# Patient Record
Sex: MALE | HISPANIC OR LATINO | Employment: FULL TIME | URBAN - METROPOLITAN AREA
[De-identification: names, ages, dates, MRNs, and addresses within clinical notes are randomized per-mention and may not be internally consistent; named-entity substitution may affect disease eponyms.]

---

## 2017-09-26 ENCOUNTER — OFFICE VISIT (OUTPATIENT)
Dept: MEDICAL GROUP | Facility: PHYSICIAN GROUP | Age: 42
End: 2017-09-26
Payer: COMMERCIAL

## 2017-09-26 VITALS
DIASTOLIC BLOOD PRESSURE: 80 MMHG | BODY MASS INDEX: 28.84 KG/M2 | HEART RATE: 60 BPM | OXYGEN SATURATION: 98 % | TEMPERATURE: 98.6 F | WEIGHT: 206 LBS | RESPIRATION RATE: 16 BRPM | SYSTOLIC BLOOD PRESSURE: 134 MMHG | HEIGHT: 71 IN

## 2017-09-26 DIAGNOSIS — Z00.00 ROUTINE GENERAL MEDICAL EXAMINATION AT A HEALTH CARE FACILITY: ICD-10-CM

## 2017-09-26 DIAGNOSIS — R03.0 PREHYPERTENSION: ICD-10-CM

## 2017-09-26 DIAGNOSIS — J01.10 ACUTE NON-RECURRENT FRONTAL SINUSITIS: ICD-10-CM

## 2017-09-26 PROCEDURE — 99214 OFFICE O/P EST MOD 30 MIN: CPT | Performed by: INTERNAL MEDICINE

## 2017-09-26 ASSESSMENT — PATIENT HEALTH QUESTIONNAIRE - PHQ9: CLINICAL INTERPRETATION OF PHQ2 SCORE: 0

## 2017-09-26 NOTE — ASSESSMENT & PLAN NOTE
Patient presents today complaining of prescription drug, sore throat, myalgia, feeling sick, stomach upset, diarrhea, dizzy (he describes as lightheaded). He not been able to exercise. He is concerned. He has had once sinus infection many years ago, similar symptoms . He denies fever. He reports that he is getting better. 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 He denies earaches, eye pain, decreased vision, headache, tinnitus, decreased hearing

## 2017-09-26 NOTE — ASSESSMENT & PLAN NOTE
Slight pre hypertension. Likes salt. He is fit. He is very active. She denies chest pain, shortness of breath, headache, blurry vision. He is feeling dizzy for the last week.

## 2017-09-26 NOTE — PROGRESS NOTES
New Patient to Establish    Reason to establish: feeling sick, establish. Preventive     Donnie Messina is a 42 y.o. male here today for evaluation and management of:    Acute non-recurrent frontal sinusitis  Patient presents today complaining of prescription drug, sore throat, myalgia, feeling sick, stomach upset, diarrhea, dizzy (he describes as lightheaded). He not been able to exercise. He is concerned. He has had once sinus infection many years ago, similar symptoms . He denies fever. He reports that he is getting better. 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 He denies earaches, eye pain, decreased vision, headache, tinnitus, decreased hearing    Prehypertension  Slight pre hypertension. Likes salt. He is fit. He is very active. She denies chest pain, shortness of breath, headache, blurry vision. He is feeling dizzy for the last week.      No past medical history on file.    No current outpatient prescriptions on file.     No current facility-administered medications for this visit.        Allergies as of 09/26/2017   • (No Known Allergies)       Social History     Social History   • Marital status: Single     Spouse name: N/A   • Number of children: N/A   • Years of education: N/A     Occupational History   • Not on file.     Social History Main Topics   • Smoking status: Former Smoker     Packs/day: 0.25     Years: 8.00     Quit date: 1/1/1997   • Smokeless tobacco: Never Used   • Alcohol use No      Comment: occ   • Drug use: No   • Sexual activity: Yes     Partners: Male      Comment: 4 children     Other Topics Concern   • Not on file     Social History Narrative    ** Merged History Encounter **            Family History   Problem Relation Age of Onset   • Cancer Neg Hx    • Diabetes Neg Hx    • Heart Disease Neg Hx        No past surgical history on file.    ROS: All systems reviewed are negative except for HPI    /80   Pulse 60   Temp 37 °C (98.6 °F)   Resp 16   Ht 1.803 m (5'  "11\")   Wt 93.4 kg (206 lb)   SpO2 98%   BMI 28.73 kg/m²     Physical Exam  General:  Alert and oriented, No apparent distress.  Eyes: Pupils equal and reactive. No scleral icterus. EOMI  Throat: Clear no erythema or exudates noted. Oral mucosa moist, oral dental intact  Neck: Supple. No cervical or supraclavicular lymphadenopathy noted. Thyroid not enlarged. Tympanic membrane intact and clear. No nystagmus, hill-pax maneuvers negative   Lungs: normal effort,  Clear to auscultation  Cardiovascular: Regular rate and rhythm. No murmurs, rubs or gallops, pulses intact   Abdomen:  Soft, +BS, no tenderness. No rebound or guarding noted. No hepato or splenomegaly   Extremities: No clubbing, cyanosis, edema.  Neuro: cranial nerves intact, sensation intact   Muscle skeletal: strength 5/5 on all extremities   Skin: Clear. No rash or suspicious skin lesions noted.    Assessment and Plan    1. Acute non-recurrent frontal sinusitis  I advised patient and saline nasal spray, gurgling, normal hydration, resting. If starts to have fever, worsening to call for reevaluation  Seems viral symptoms  Doing better  If eyes are still itchy, scratchy. If persistent to consider ophthalmologist referral      2. Prehypertension  Continue to monitor, if not better to consider treatment     3. Routine general medical examination at a health care facility  Lab work to follow   He is sick , hold in influenza and dtap at this time    - CBC WITH DIFFERENTIAL; Future  - COMP METABOLIC PANEL; Future  - LIPID PROFILE; Future      Followup: Return if symptoms worsen or fail to improve, for Short.    Signed by: Eligio Gilmore M.D.  "

## 2017-09-28 ENCOUNTER — HOSPITAL ENCOUNTER (OUTPATIENT)
Dept: LAB | Facility: MEDICAL CENTER | Age: 42
End: 2017-09-28
Attending: INTERNAL MEDICINE
Payer: COMMERCIAL

## 2017-09-28 DIAGNOSIS — Z00.00 ROUTINE GENERAL MEDICAL EXAMINATION AT A HEALTH CARE FACILITY: ICD-10-CM

## 2017-09-28 LAB
ALBUMIN SERPL BCP-MCNC: 4.4 G/DL (ref 3.2–4.9)
ALBUMIN/GLOB SERPL: 1.4 G/DL
ALP SERPL-CCNC: 42 U/L (ref 30–99)
ALT SERPL-CCNC: 28 U/L (ref 2–50)
ANION GAP SERPL CALC-SCNC: 8 MMOL/L (ref 0–11.9)
AST SERPL-CCNC: 23 U/L (ref 12–45)
BASOPHILS # BLD AUTO: 0.4 % (ref 0–1.8)
BASOPHILS # BLD: 0.02 K/UL (ref 0–0.12)
BILIRUB SERPL-MCNC: 0.8 MG/DL (ref 0.1–1.5)
BUN SERPL-MCNC: 20 MG/DL (ref 8–22)
CALCIUM SERPL-MCNC: 9.7 MG/DL (ref 8.5–10.5)
CHLORIDE SERPL-SCNC: 103 MMOL/L (ref 96–112)
CHOLEST SERPL-MCNC: 253 MG/DL (ref 100–199)
CO2 SERPL-SCNC: 26 MMOL/L (ref 20–33)
CREAT SERPL-MCNC: 0.9 MG/DL (ref 0.5–1.4)
EOSINOPHIL # BLD AUTO: 0.24 K/UL (ref 0–0.51)
EOSINOPHIL NFR BLD: 5 % (ref 0–6.9)
ERYTHROCYTE [DISTWIDTH] IN BLOOD BY AUTOMATED COUNT: 42.6 FL (ref 35.9–50)
GFR SERPL CREATININE-BSD FRML MDRD: >60 ML/MIN/1.73 M 2
GLOBULIN SER CALC-MCNC: 3.2 G/DL (ref 1.9–3.5)
GLUCOSE SERPL-MCNC: 64 MG/DL (ref 65–99)
HCT VFR BLD AUTO: 48.2 % (ref 42–52)
HDLC SERPL-MCNC: 48 MG/DL
HGB BLD-MCNC: 15.6 G/DL (ref 14–18)
IMM GRANULOCYTES # BLD AUTO: 0.02 K/UL (ref 0–0.11)
IMM GRANULOCYTES NFR BLD AUTO: 0.4 % (ref 0–0.9)
LDLC SERPL CALC-MCNC: 187 MG/DL
LYMPHOCYTES # BLD AUTO: 1.62 K/UL (ref 1–4.8)
LYMPHOCYTES NFR BLD: 33.8 % (ref 22–41)
MCH RBC QN AUTO: 29.4 PG (ref 27–33)
MCHC RBC AUTO-ENTMCNC: 32.4 G/DL (ref 33.7–35.3)
MCV RBC AUTO: 90.9 FL (ref 81.4–97.8)
MONOCYTES # BLD AUTO: 0.33 K/UL (ref 0–0.85)
MONOCYTES NFR BLD AUTO: 6.9 % (ref 0–13.4)
NEUTROPHILS # BLD AUTO: 2.56 K/UL (ref 1.82–7.42)
NEUTROPHILS NFR BLD: 53.5 % (ref 44–72)
NRBC # BLD AUTO: 0 K/UL
NRBC BLD AUTO-RTO: 0 /100 WBC
PLATELET # BLD AUTO: 231 K/UL (ref 164–446)
PMV BLD AUTO: 9 FL (ref 9–12.9)
POTASSIUM SERPL-SCNC: 3.9 MMOL/L (ref 3.6–5.5)
PROT SERPL-MCNC: 7.6 G/DL (ref 6–8.2)
RBC # BLD AUTO: 5.3 M/UL (ref 4.7–6.1)
SODIUM SERPL-SCNC: 137 MMOL/L (ref 135–145)
TRIGL SERPL-MCNC: 88 MG/DL (ref 0–149)
WBC # BLD AUTO: 4.8 K/UL (ref 4.8–10.8)

## 2017-09-28 PROCEDURE — 80061 LIPID PANEL: CPT

## 2017-09-28 PROCEDURE — 85025 COMPLETE CBC W/AUTO DIFF WBC: CPT

## 2017-09-28 PROCEDURE — 36415 COLL VENOUS BLD VENIPUNCTURE: CPT

## 2017-09-28 PROCEDURE — 80053 COMPREHEN METABOLIC PANEL: CPT

## 2017-09-29 ENCOUNTER — TELEPHONE (OUTPATIENT)
Dept: MEDICAL GROUP | Facility: PHYSICIAN GROUP | Age: 42
End: 2017-09-29

## 2017-09-29 NOTE — TELEPHONE ENCOUNTER
----- Message from Eligio Gilmore M.D. sent at 9/29/2017  7:13 AM PDT -----  Please let pt knows that electrolytes, kidney function, liver enzymes, complete blood count are normal  Cholesterol is moderately elevated. No need for treatment at this time, but definitely I would encourage eating healthy, fruits and vegetables, low fat, exercising 3-5 times per week. Avoid dairy product, red meat, bif portion, fried food  We will continue to monitor   Thank you,  Eligio Gilmore M.D.

## 2017-09-29 NOTE — TELEPHONE ENCOUNTER
Phone Number Called: [phone] (home)     Message: LVM to call back.     Left Message for patient to call back: yes

## 2017-09-29 NOTE — LETTER
October 4, 2017        Donnie Messina  [id number]  [place] [address]        Dear Donnie:    Our office has been unable to reach you by phone.  Please call our office at your earliest convenience.  Thank you.       Sincerely,        Eligio Gilmore M.D.    Electronically Signed

## 2017-10-02 NOTE — TELEPHONE ENCOUNTER
Phone Number Called: [phone] (home)     Message: LVM to call back.     Left Message for patient to call back: yes

## 2017-10-04 NOTE — TELEPHONE ENCOUNTER
Phone Number Called: [phone] (home)     Message: ITALO to call back.  Letter mailed to patient requesting he call our office.     Left Message for patient to call back: yes

## 2017-10-23 ENCOUNTER — OFFICE VISIT (OUTPATIENT)
Dept: MEDICAL GROUP | Facility: PHYSICIAN GROUP | Age: 42
End: 2017-10-23
Payer: COMMERCIAL

## 2017-10-23 VITALS
DIASTOLIC BLOOD PRESSURE: 102 MMHG | OXYGEN SATURATION: 97 % | BODY MASS INDEX: 28.56 KG/M2 | RESPIRATION RATE: 16 BRPM | SYSTOLIC BLOOD PRESSURE: 142 MMHG | HEART RATE: 68 BPM | HEIGHT: 71 IN | TEMPERATURE: 97.7 F | WEIGHT: 204 LBS

## 2017-10-23 DIAGNOSIS — J01.10 ACUTE NON-RECURRENT FRONTAL SINUSITIS: ICD-10-CM

## 2017-10-23 DIAGNOSIS — E78.2 MIXED HYPERLIPIDEMIA: ICD-10-CM

## 2017-10-23 DIAGNOSIS — I10 ESSENTIAL HYPERTENSION: ICD-10-CM

## 2017-10-23 DIAGNOSIS — Z00.00 ROUTINE GENERAL MEDICAL EXAMINATION AT A HEALTH CARE FACILITY: ICD-10-CM

## 2017-10-23 PROCEDURE — 99214 OFFICE O/P EST MOD 30 MIN: CPT | Performed by: INTERNAL MEDICINE

## 2017-10-23 RX ORDER — LISINOPRIL 10 MG/1
10 TABLET ORAL DAILY
Qty: 30 TAB | Refills: 3 | Status: SHIPPED | OUTPATIENT
Start: 2017-10-23 | End: 2018-01-30 | Stop reason: SDUPTHER

## 2017-10-23 RX ORDER — AMOXICILLIN AND CLAVULANATE POTASSIUM 875; 125 MG/1; MG/1
1 TABLET, FILM COATED ORAL 2 TIMES DAILY
Qty: 20 TAB | Refills: 0 | Status: SHIPPED | OUTPATIENT
Start: 2017-10-23 | End: 2017-11-20

## 2017-10-23 ASSESSMENT — PAIN SCALES - GENERAL: PAINLEVEL: NO PAIN

## 2017-10-23 NOTE — ASSESSMENT & PLAN NOTE
. He needs to be on cholesterol meds/ since we started BP meds today. Will continue to monitor, lifestyle modifications, we will monitor in 6 months. Pt verbalized understanding

## 2017-10-23 NOTE — ASSESSMENT & PLAN NOTE
BP is elevated, I personally rechecked it and still elevated. he does not check BP at home. he denies chest pain, shortness of breath, leg swelling, palpitation, lightheadedness. He exercise, tries to eat healthy.

## 2017-10-23 NOTE — ASSESSMENT & PLAN NOTE
He is up to date with lab work, immunizations. He is not feeling great. Will postpone influenza at this time

## 2017-10-23 NOTE — PROGRESS NOTES
Subjective:   Donnie Messina is a 42 y.o. male here today for sinus infection     I have seen pt 09/26/2017. He still feels congested, sob when he goes to sleep. Not better. He denies fever, he has mild earache on the left ear. He has slight frontal tenderness on palpation. He tonsils are enlarged. He had been using saline nasal washing, but are not resolving the problem.   He has not been able to exercise lately due to feeling sick      BP elevated today. He does not take blood pressure at home. She denies chest pain, shortness of breath, swelling, or tenderness, headache.     I reviewed lab work with him CBC normal (mother with leukemia), electrolytes, liver enzymes, kidney functions are all normal. Cholesterol is significantly elevated. I will start treatment for hypertension today. We will reevaluate cholesterol in 6 months and decide about treatment, he is fit, exercise regularly, active, tries to eat healthy     Current medicines (including changes today)  Current Outpatient Prescriptions   Medication Sig Dispense Refill   • amoxicillin-clavulanate (AUGMENTIN) 875-125 MG Tab Take 1 Tab by mouth 2 times a day. 20 Tab 0   • lisinopril (PRINIVIL) 10 MG Tab Take 1 Tab by mouth every day. 30 Tab 3     No current facility-administered medications for this visit.      He  has no past medical history on file.    Current Outpatient Prescriptions   Medication Sig Dispense Refill   • amoxicillin-clavulanate (AUGMENTIN) 875-125 MG Tab Take 1 Tab by mouth 2 times a day. 20 Tab 0   • lisinopril (PRINIVIL) 10 MG Tab Take 1 Tab by mouth every day. 30 Tab 3     No current facility-administered medications for this visit.        Allergies as of 10/23/2017   • (No Known Allergies)       Social History     Social History   • Marital status: Single     Spouse name: N/A   • Number of children: N/A   • Years of education: N/A     Occupational History   • Not on file.     Social History Main Topics   • Smoking status:  "Former Smoker     Packs/day: 0.25     Years: 8.00     Quit date: 1/1/1997   • Smokeless tobacco: Never Used   • Alcohol use No      Comment: occ   • Drug use: No   • Sexual activity: Yes     Partners: Male      Comment: 4 children     Other Topics Concern   • Not on file     Social History Narrative    ** Merged History Encounter **             Family History   Problem Relation Age of Onset   • Cancer Neg Hx    • Diabetes Neg Hx    • Heart Disease Neg Hx        History reviewed. No pertinent surgical history.    ROS   All systems reviewed are negative except for HPI     Objective:     Blood pressure 142/102, pulse 68, temperature 36.5 °C (97.7 °F), resp. rate 16, height 1.803 m (5' 11\"), weight 92.5 kg (204 lb), SpO2 97 %. Body mass index is 28.45 kg/m².   Physical Exam:  Constitutional: Alert, no distress.  Skin: Warm, dry, good turgor, no rashes in visible areas.  Eye: Equal, round and reactive, conjunctiva clear, lids normal.  ENMT: Lips without lesions, good dentition, oropharynx clear, tonsils enlarged.  Neck: Trachea midline, no masses, no thyromegaly. + left cervical lymphadenopathy, no supraclavicular lymphadenopathy  Respiratory: Unlabored respiratory effort, lungs clear to auscultation, no wheezes, no ronchi.  Cardiovascular: Normal S1, S2, no murmur, no edema.  Abdomen: Soft, non-tender, no masses, no hepatosplenomegaly.  Psych: Alert and oriented x3, normal affect and mood.        Assessment and Plan:   The following treatment plan was discussed    1. Acute non-recurrent frontal sinusitis  Seems more bacterial superimposed infection, vs allergies.   This patient to continue saline nasal spray, Flonase as needed.  I will also start on augmentin. continue to monitor   He denies seasonal allergies     2. Essential hypertension  We will start on lisinopril. I discussed with patient side effects, as dry cough, importance of close monitoring. Pt agreed with the plan   - lisinopril (PRINIVIL) 10 MG Tab; Take 1 " Tab by mouth every day.  Dispense: 30 Tab; Refill: 3    3. Mixed hyperlipidemia  Continue to monitor, follow-up in 6 months      Followup: Return in about 4 weeks (around 11/20/2017) for Short, follow up on HTN.

## 2017-11-20 ENCOUNTER — OFFICE VISIT (OUTPATIENT)
Dept: MEDICAL GROUP | Facility: PHYSICIAN GROUP | Age: 42
End: 2017-11-20
Payer: COMMERCIAL

## 2017-11-20 VITALS
RESPIRATION RATE: 16 BRPM | DIASTOLIC BLOOD PRESSURE: 78 MMHG | HEIGHT: 71 IN | WEIGHT: 200 LBS | OXYGEN SATURATION: 98 % | SYSTOLIC BLOOD PRESSURE: 142 MMHG | BODY MASS INDEX: 28 KG/M2 | HEART RATE: 62 BPM | TEMPERATURE: 97.5 F

## 2017-11-20 DIAGNOSIS — R21 RASH: ICD-10-CM

## 2017-11-20 DIAGNOSIS — Z23 NEED FOR VACCINATION: ICD-10-CM

## 2017-11-20 DIAGNOSIS — E78.2 MIXED HYPERLIPIDEMIA: ICD-10-CM

## 2017-11-20 DIAGNOSIS — Z30.09 VASECTOMY EVALUATION: ICD-10-CM

## 2017-11-20 DIAGNOSIS — I10 ESSENTIAL HYPERTENSION: ICD-10-CM

## 2017-11-20 PROCEDURE — 90472 IMMUNIZATION ADMIN EACH ADD: CPT | Performed by: INTERNAL MEDICINE

## 2017-11-20 PROCEDURE — 90471 IMMUNIZATION ADMIN: CPT | Performed by: INTERNAL MEDICINE

## 2017-11-20 PROCEDURE — 99214 OFFICE O/P EST MOD 30 MIN: CPT | Mod: 25 | Performed by: INTERNAL MEDICINE

## 2017-11-20 PROCEDURE — 90686 IIV4 VACC NO PRSV 0.5 ML IM: CPT | Performed by: INTERNAL MEDICINE

## 2017-11-20 PROCEDURE — 90715 TDAP VACCINE 7 YRS/> IM: CPT | Performed by: INTERNAL MEDICINE

## 2017-11-20 RX ORDER — TRIAMCINOLONE ACETONIDE 1 MG/G
1 CREAM TOPICAL 2 TIMES DAILY
Qty: 1 TUBE | Refills: 0 | Status: SHIPPED | OUTPATIENT
Start: 2017-11-20 | End: 2018-09-25

## 2017-11-20 NOTE — ASSESSMENT & PLAN NOTE
He was taking lisinopril for one week, and suddenly started to develop a rash on his right arm first patch, couple other patches on the right thigh. It describes them very itchy. No scaly tissue, never had them before. He thought it was from lisinopril. He stopped the medication, but lesions did not resolved. He denies fever, no other lesions, tried allergy medication, but did not resolved the problem

## 2017-11-20 NOTE — ASSESSMENT & PLAN NOTE
He denies side effects from lisinopril, besides questionable rash. He is not taking it. He reports that he is trying everything he can. He is exercising everything, he is trying to eat healthy, he is trying garlic in the morning. Still elevated for his age.

## 2017-11-20 NOTE — ASSESSMENT & PLAN NOTE
He has 4 children. He is with a girlfriend. He does not want children anymore, and would like to be evaluated for vasectomy. He denies discharges, previous STD. Referral in place

## 2017-11-20 NOTE — PROGRESS NOTES
Subjective:   Donnie Messina is a 42 y.o. male here today for hypertension, rash, vasectomy     Vasectomy evaluation  He has 4 children. He is with a girlfriend. He does not want children anymore, and would like to be evaluated for vasectomy. He denies discharges, previous STD. Referral in place    Rash  He was taking lisinopril for one week, and suddenly started to develop a rash on his right arm first patch, couple other patches on the right thigh. It describes them very itchy. No scaly tissue, never had them before. He thought it was from lisinopril. He stopped the medication, but lesions did not resolved. He denies fever, no other lesions, tried allergy medication, but did not resolved the problem    Mixed hyperlipidemia  We will reevaluate at next apt.     Essential hypertension  He denies side effects from lisinopril, besides questionable rash. He is not taking it. He reports that he is trying everything he can. He is exercising everything, he is trying to eat healthy, he is trying garlic in the morning. Still elevated for his age.        Current medicines (including changes today)  Current Outpatient Prescriptions   Medication Sig Dispense Refill   • triamcinolone acetonide (KENALOG) 0.1 % Cream Apply 1 Application to affected area(s) 2 times a day. 1 Tube 0   • lisinopril (PRINIVIL) 10 MG Tab Take 1 Tab by mouth every day. 30 Tab 3     No current facility-administered medications for this visit.      He  has no past medical history on file.    Current Outpatient Prescriptions   Medication Sig Dispense Refill   • triamcinolone acetonide (KENALOG) 0.1 % Cream Apply 1 Application to affected area(s) 2 times a day. 1 Tube 0   • lisinopril (PRINIVIL) 10 MG Tab Take 1 Tab by mouth every day. 30 Tab 3     No current facility-administered medications for this visit.        Allergies as of 11/20/2017   • (No Known Allergies)       Social History     Social History   • Marital status: Single     Spouse  "name: N/A   • Number of children: N/A   • Years of education: N/A     Occupational History   • Not on file.     Social History Main Topics   • Smoking status: Former Smoker     Packs/day: 0.25     Years: 8.00     Quit date: 1/1/1997   • Smokeless tobacco: Never Used   • Alcohol use No      Comment: occ   • Drug use: No   • Sexual activity: Yes     Partners: Female      Comment: 4 children     Other Topics Concern   • Not on file     Social History Narrative    ** Merged History Encounter **             Family History   Problem Relation Age of Onset   • Cancer Neg Hx    • Diabetes Neg Hx    • Heart Disease Neg Hx        History reviewed. No pertinent surgical history.    ROS   All systems reviewed are negative except for HPI       Objective:     Blood pressure 142/78, pulse 62, temperature 36.4 °C (97.5 °F), resp. rate 16, height 1.803 m (5' 11\"), weight 90.7 kg (200 lb), SpO2 98 %. Body mass index is 27.89 kg/m².   Physical Exam:  Constitutional: Alert, no distress.  Skin: Warm, dry. he has couple of papules on right thigh, not blanchable, not cleared, no white scaly   Eye: Equal, round and reactive, conjunctiva clear, lids normal.  ENMT: Lips without lesions, good dentition, oropharynx clear.  Neck: Trachea midline, no masses, no thyromegaly. No cervical or supraclavicular lymphadenopathy  Respiratory: Unlabored respiratory effort, lungs clear to auscultation, no wheezes, no ronchi.  Cardiovascular: Normal S1, S2, no murmur, no edema.  Abdomen: Soft, non-tender, no masses, no hepatosplenomegaly.  Psych: Alert and oriented x3, normal affect and mood.        Assessment and Plan:   The following treatment plan was discussed    1. Rash  Possible eczema. It is not blanchable. Not likely hives. I will try steroid cream. Follow up with derm if not resolving     - triamcinolone acetonide (KENALOG) 0.1 % Cream; Apply 1 Application to affected area(s) 2 times a day.  Dispense: 1 Tube; Refill: 0  - REFERRAL TO " DERMATOLOGY    2. Essential hypertension  I advised to restart medication. reevaluate in 2 months     3. Vasectomy evaluation  Follow up with urology   - REFERRAL TO UROLOGY    4. Need for vaccination  - INFLUENZA VACCINE QUAD INJ >3Y(PF)  - TDAP VACCINE =>6YO IM    5. Mixed hyperlipidemia  Continue to monitor       Followup: Return in about 2 months (around 1/20/2018), or if symptoms worsen or fail to improve, for Short.

## 2017-12-13 ENCOUNTER — OFFICE VISIT (OUTPATIENT)
Dept: DERMATOLOGY | Facility: IMAGING CENTER | Age: 42
End: 2017-12-13
Payer: COMMERCIAL

## 2017-12-13 ENCOUNTER — HOSPITAL ENCOUNTER (OUTPATIENT)
Facility: MEDICAL CENTER | Age: 42
End: 2017-12-13
Attending: DERMATOLOGY
Payer: COMMERCIAL

## 2017-12-13 VITALS — HEIGHT: 71 IN | WEIGHT: 206 LBS | TEMPERATURE: 97.9 F | BODY MASS INDEX: 28.84 KG/M2

## 2017-12-13 DIAGNOSIS — L98.9 DISORDER OF THE SKIN AND SUBCUTANEOUS TISSUE, UNSPECIFIED: ICD-10-CM

## 2017-12-13 PROCEDURE — 99203 OFFICE O/P NEW LOW 30 MIN: CPT | Mod: 25 | Performed by: DERMATOLOGY

## 2017-12-13 PROCEDURE — 88305 TISSUE EXAM BY PATHOLOGIST: CPT

## 2017-12-13 PROCEDURE — 11100 PR BIOPSY OF SKIN LESION: CPT | Performed by: DERMATOLOGY

## 2017-12-13 ASSESSMENT — ENCOUNTER SYMPTOMS
FEVER: 0
WEIGHT LOSS: 0
CHILLS: 0

## 2017-12-13 NOTE — PROGRESS NOTES
Dermatology New Patient Visit    Chief Complaint   Patient presents with   • Rash       Subjective:     HPI:   Donnie Messina is a 42 y.o. male presenting for    Rash x ~1 month   Affects the right leg, right arm, back/trunk  Notes red, swollen, very itchy spots  Has tried using triamcinolone cream w/ minimal relief  Believes the rash first started about a week after he started new medications from PCP (was given lisinopril for HTN and augmentin at the time)  Stopped the pills for about 4 days, no change in rash, but when he started up again, he believe he noted a few more spots  Lives in an apartment with uncle, he does not have a rash -- denies any reports of bed bugs, or having seen any  History of skin cancer: No  History of biopsies:No  History of blistering/severe sunburns:No  Family history of skin cancer:No  Family history of atypical moles:No        No past medical history on file.    Current Outpatient Prescriptions on File Prior to Visit   Medication Sig Dispense Refill   • triamcinolone acetonide (KENALOG) 0.1 % Cream Apply 1 Application to affected area(s) 2 times a day. 1 Tube 0   • lisinopril (PRINIVIL) 10 MG Tab Take 1 Tab by mouth every day. 30 Tab 3     No current facility-administered medications on file prior to visit.        No Known Allergies    Family History   Problem Relation Age of Onset   • Cancer Neg Hx    • Diabetes Neg Hx    • Heart Disease Neg Hx        Social History     Social History   • Marital status: Single     Spouse name: N/A   • Number of children: N/A   • Years of education: N/A     Occupational History   • Not on file.     Social History Main Topics   • Smoking status: Former Smoker     Packs/day: 0.25     Years: 8.00     Quit date: 1/1/1997   • Smokeless tobacco: Never Used   • Alcohol use No      Comment: occ   • Drug use: No   • Sexual activity: Yes     Partners: Female      Comment: 4 children     Other Topics Concern   • Not on file     Social History  "Narrative    ** Merged History Encounter **            Review of Systems   Constitutional: Negative for chills, fever and weight loss.   Skin: Positive for itching and rash.   All other systems reviewed and are negative.       Objective:     A focused cutaneous exam was completed including: hair, ears, face, eyelids, conjunctiva, lips, neck, chest, abdomen, back, left and right upper extremities (including hands/digits and fingernails), with the following pertinent findings listed below. Remaining above-listed examined areas within normal limits / negative for rashes or lesions.    Temperature 36.6 °C (97.9 °F), height 1.803 m (5' 11\"), weight 93.4 kg (206 lb).    Physical Exam   Constitutional: He is oriented to person, place, and time and well-developed, well-nourished, and in no distress.   HENT:   Head: Normocephalic and atraumatic.   Right Ear: External ear normal.   Left Ear: External ear normal.   Nose: Nose normal.   Eyes: Conjunctivae are normal.   Neck: Normal range of motion.   Pulmonary/Chest: Effort normal.   Neurological: He is alert and oriented to person, place, and time.   Skin: Skin is warm and dry.        Psychiatric: Mood and affect normal.   Vitals reviewed.      DATA: none applicable to review    Assessment and Plan:     1. Disorder of the skin and subcutaneous tissue, unspecified  Comment: ddx broad at this point -- papular urticaria/dermal hypersensitivity vs bites (slightly linear appearance of spots on the back) vs ?fixed drug eruption (not classical in appearance) vs  unlikely urticarial vasculitis  - educated patient about possible diagnoses, management options, and expectations of treatment  - we discussed risks/benefits of biopsy today, patient agreed, see procedure note  - recommended zyrtec 10mg qhs, titrate up to BID as tolerated -- discussed s/e drowsiness  - instructed to check for bed bugs in the home  - if c/w possible fixed drug (not classic), would discuss with PCP regarding " medication switch  - can continue triamcinolone 0.1% ointment to affected area of the body twice daily when active/itchy. Side effects discussed, including skin thinning, appearance of stretch marks (striae), easy bruising, telangiectasias, and possible increased hair growth     Followup: Return in about 1 week (around 12/20/2017) for suture.    Dory Serna M.D.

## 2017-12-19 ENCOUNTER — OFFICE VISIT (OUTPATIENT)
Dept: DERMATOLOGY | Facility: IMAGING CENTER | Age: 42
End: 2017-12-19
Payer: COMMERCIAL

## 2017-12-19 DIAGNOSIS — L98.9 DISORDER OF THE SKIN AND SUBCUTANEOUS TISSUE, UNSPECIFIED: ICD-10-CM

## 2017-12-19 NOTE — PROGRESS NOTES
Patient seen by Sujey ALMEIDA) for suture removal today. No problems with surgical site. Further f/u discussed over the phone.    Dory Serna

## 2017-12-20 ENCOUNTER — TELEPHONE (OUTPATIENT)
Dept: DERMATOLOGY | Facility: IMAGING CENTER | Age: 42
End: 2017-12-20

## 2018-01-23 ENCOUNTER — APPOINTMENT (OUTPATIENT)
Dept: MEDICAL GROUP | Facility: PHYSICIAN GROUP | Age: 43
End: 2018-01-23
Payer: COMMERCIAL

## 2018-01-30 ENCOUNTER — OFFICE VISIT (OUTPATIENT)
Dept: MEDICAL GROUP | Facility: PHYSICIAN GROUP | Age: 43
End: 2018-01-30
Payer: COMMERCIAL

## 2018-01-30 VITALS
SYSTOLIC BLOOD PRESSURE: 138 MMHG | OXYGEN SATURATION: 97 % | TEMPERATURE: 97.6 F | BODY MASS INDEX: 28.42 KG/M2 | HEART RATE: 56 BPM | RESPIRATION RATE: 14 BRPM | DIASTOLIC BLOOD PRESSURE: 82 MMHG | HEIGHT: 71 IN | WEIGHT: 203 LBS

## 2018-01-30 DIAGNOSIS — E16.2 HYPOGLYCEMIA: ICD-10-CM

## 2018-01-30 DIAGNOSIS — E78.2 MIXED HYPERLIPIDEMIA: ICD-10-CM

## 2018-01-30 DIAGNOSIS — L30.9 ECZEMA, UNSPECIFIED TYPE: ICD-10-CM

## 2018-01-30 DIAGNOSIS — I10 ESSENTIAL HYPERTENSION: ICD-10-CM

## 2018-01-30 PROCEDURE — 99214 OFFICE O/P EST MOD 30 MIN: CPT | Performed by: INTERNAL MEDICINE

## 2018-01-30 RX ORDER — LISINOPRIL 10 MG/1
10 TABLET ORAL DAILY
Qty: 90 TAB | Refills: 3 | Status: SHIPPED | OUTPATIENT
Start: 2018-01-30 | End: 2018-07-24

## 2018-01-30 NOTE — ASSESSMENT & PLAN NOTE
From review of lab work. I will continue to monitor. A1c at next appointment to rule out diabetes.

## 2018-01-30 NOTE — ASSESSMENT & PLAN NOTE
He continue to have the rash on his arm. Sometime itchy. He uses steroid cream and gets better. He saw dermatology, and was told not to be concerned

## 2018-01-30 NOTE — PROGRESS NOTES
Subjective:   Donnie Messina is a 42 y.o. male here today for hypertension, lab work     Essential hypertension  He suppose to be on lisinopril 10 mg daily. He denies side effects from that. he does not check blood pressure at home. He denies chest pain, shortness of breath, leg swelling, palpitations or diaphoresis.    Hypoglycemia  From review of lab work. I will continue to monitor. A1c at next appointment to rule out diabetes.    Mixed hyperlipidemia  LDL is very elevated, continue to monitor, consider statin if remains the same at next apt     Eczema  He continue to have the rash on his arm. Sometime itchy. He uses steroid cream and gets better. He saw dermatology, and was told not to be concerned        Current medicines (including changes today)  Current Outpatient Prescriptions   Medication Sig Dispense Refill   • lisinopril (PRINIVIL) 10 MG Tab Take 1 Tab by mouth every day. 90 Tab 3   • triamcinolone acetonide (KENALOG) 0.1 % Cream Apply 1 Application to affected area(s) 2 times a day. 1 Tube 0     No current facility-administered medications for this visit.      He  has no past medical history on file.    Current Outpatient Prescriptions   Medication Sig Dispense Refill   • lisinopril (PRINIVIL) 10 MG Tab Take 1 Tab by mouth every day. 90 Tab 3   • triamcinolone acetonide (KENALOG) 0.1 % Cream Apply 1 Application to affected area(s) 2 times a day. 1 Tube 0     No current facility-administered medications for this visit.        Allergies as of 01/30/2018   • (No Known Allergies)       Social History     Social History   • Marital status: Single     Spouse name: N/A   • Number of children: N/A   • Years of education: N/A     Occupational History   • Not on file.     Social History Main Topics   • Smoking status: Former Smoker     Packs/day: 0.25     Years: 8.00     Quit date: 1/1/1997   • Smokeless tobacco: Never Used   • Alcohol use No      Comment: occ   • Drug use: No   • Sexual activity: Yes  "    Partners: Female      Comment: 4 children     Other Topics Concern   • Not on file     Social History Narrative    ** Merged History Encounter **             Family History   Problem Relation Age of Onset   • Cancer Neg Hx    • Diabetes Neg Hx    • Heart Disease Neg Hx        History reviewed. No pertinent surgical history.    ROS   All systems reviewed are negative except for HPI       Objective:     Blood pressure 138/82, pulse (!) 56, temperature 36.4 °C (97.6 °F), resp. rate 14, height 1.803 m (5' 11\"), weight 92.1 kg (203 lb), SpO2 97 %. Body mass index is 28.31 kg/m².   Physical Exam:  Constitutional: Alert, no distress.  Skin: Warm, dry, good turgor, as per above, erythematous patches on her arm., well marked   Eye: Equal, round and reactive, conjunctiva clear, lids normal.  ENMT: Lips without lesions, good dentition, oropharynx clear.  Neck: Trachea midline, no masses, no thyromegaly. No cervical or supraclavicular lymphadenopathy  Respiratory: Unlabored respiratory effort, lungs clear to auscultation, no wheezes, no ronchi.  Cardiovascular: Normal S1, S2, no murmur, no edema.  Abdomen: Soft, non-tender, no masses, no hepatosplenomegaly.  Psych: Alert and oriented x3, normal affect and mood.        Assessment and Plan:   The following treatment plan was discussed    1. Essential hypertension  Continue same treatment. Continue to monitor  - lisinopril (PRINIVIL) 10 MG Tab; Take 1 Tab by mouth every day.  Dispense: 90 Tab; Refill: 3  - CBC WITH DIFFERENTIAL; Future    2. Mixed hyperlipidemia  Continue to monitor. Consider statin, if remains elevated  - CBC WITH DIFFERENTIAL; Future  - LIPID PROFILE; Future    3. Eczema, unspecified type  Steroid cream prn     4. Hypoglycemia  Continue to monitor, A1c for next time, to rule out diabetes   - COMP METABOLIC PANEL; Future  - HEMOGLOBIN A1C; Future      Followup: No Follow-up on file.         "

## 2018-05-02 ENCOUNTER — TELEPHONE (OUTPATIENT)
Dept: DERMATOLOGY | Facility: IMAGING CENTER | Age: 43
End: 2018-05-02

## 2018-05-02 NOTE — TELEPHONE ENCOUNTER
Spoke with patient today, still very itchy. Will have him come in the next 3-4 weeks for repeat f/u visit.

## 2018-07-24 ENCOUNTER — OFFICE VISIT (OUTPATIENT)
Dept: MEDICAL GROUP | Facility: PHYSICIAN GROUP | Age: 43
End: 2018-07-24
Payer: COMMERCIAL

## 2018-07-24 VITALS
RESPIRATION RATE: 16 BRPM | DIASTOLIC BLOOD PRESSURE: 84 MMHG | WEIGHT: 200 LBS | HEIGHT: 71 IN | BODY MASS INDEX: 28 KG/M2 | OXYGEN SATURATION: 97 % | HEART RATE: 80 BPM | TEMPERATURE: 97.7 F | SYSTOLIC BLOOD PRESSURE: 142 MMHG

## 2018-07-24 DIAGNOSIS — E78.2 MIXED HYPERLIPIDEMIA: ICD-10-CM

## 2018-07-24 DIAGNOSIS — E16.2 HYPOGLYCEMIA: ICD-10-CM

## 2018-07-24 DIAGNOSIS — L73.9 FOLLICULITIS: ICD-10-CM

## 2018-07-24 DIAGNOSIS — I10 ESSENTIAL HYPERTENSION: ICD-10-CM

## 2018-07-24 PROBLEM — Z30.09 VASECTOMY EVALUATION: Status: RESOLVED | Noted: 2017-09-26 | Resolved: 2018-07-24

## 2018-07-24 PROCEDURE — 99214 OFFICE O/P EST MOD 30 MIN: CPT | Performed by: INTERNAL MEDICINE

## 2018-07-24 RX ORDER — LISINOPRIL 20 MG/1
20 TABLET ORAL DAILY
Qty: 90 TAB | Refills: 3 | Status: SHIPPED | OUTPATIENT
Start: 2018-07-24 | End: 2023-08-30 | Stop reason: SDUPTHER

## 2018-07-24 RX ORDER — DOXYCYCLINE HYCLATE 100 MG
100 TABLET ORAL 2 TIMES DAILY
Qty: 28 TAB | Refills: 0 | Status: SHIPPED | OUTPATIENT
Start: 2018-07-24 | End: 2018-08-17

## 2018-07-24 ASSESSMENT — PAIN SCALES - GENERAL: PAINLEVEL: NO PAIN

## 2018-07-24 NOTE — ASSESSMENT & PLAN NOTE
LDL significantly elevated. He refused treatment, last time. We will review lab work and discuss treatment options next time

## 2018-07-24 NOTE — PROGRESS NOTES
Subjective:   Donnie Messina is a 43 y.o. male here today for hypertension, skin lesion, lab work    Essential hypertension  Blood pressure still slightly elevated.  He reports compliant with medication, lisinopril 10 mg daily.  He denies chest pain, shortness of breath, lightheadedness or headache.  He has healthy lifestyle.  He eats healthy, exercising 3 times a week.  He does not know much about his family history.    Folliculitis  He reports some blister, patchy erythematous lesion on his head, few of them with yellow spots as folliculitis. + itchy. He has had them for couple of months. 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 No fever. He has had some lesion on his arm. I have sent him to dermatology. He had skin biopsy and possible allergies.     Hypoglycemia  From lab work review. Continue to monitor. Rule out diabetes     Mixed hyperlipidemia  LDL significantly elevated. He refused treatment, last time. We will review lab work and discuss treatment options next time        Current medicines (including changes today)  Current Outpatient Prescriptions   Medication Sig Dispense Refill   • lisinopril (PRINIVIL) 20 MG Tab Take 1 Tab by mouth every day. 90 Tab 3   • doxycycline (VIBRAMYCIN) 100 MG Tab Take 1 Tab by mouth 2 times a day. 28 Tab 0   • triamcinolone acetonide (KENALOG) 0.1 % Cream Apply 1 Application to affected area(s) 2 times a day. 1 Tube 0     No current facility-administered medications for this visit.      He  has no past medical history on file.    Current Outpatient Prescriptions   Medication Sig Dispense Refill   • lisinopril (PRINIVIL) 20 MG Tab Take 1 Tab by mouth every day. 90 Tab 3   • doxycycline (VIBRAMYCIN) 100 MG Tab Take 1 Tab by mouth 2 times a day. 28 Tab 0   • triamcinolone acetonide (KENALOG) 0.1 % Cream Apply 1 Application to affected area(s) 2 times a day. 1 Tube 0     No current facility-administered medications for this visit.        Allergies as of  "07/24/2018   • (No Known Allergies)       Social History     Social History   • Marital status: Single     Spouse name: N/A   • Number of children: N/A   • Years of education: N/A     Occupational History   • Not on file.     Social History Main Topics   • Smoking status: Former Smoker     Packs/day: 0.25     Years: 8.00     Quit date: 1/1/1997   • Smokeless tobacco: Never Used   • Alcohol use No      Comment: occ   • Drug use: No   • Sexual activity: Yes     Partners: Female      Comment: 4 children     Other Topics Concern   • Not on file     Social History Narrative    ** Merged History Encounter **             Family History   Problem Relation Age of Onset   • Cancer Neg Hx    • Diabetes Neg Hx    • Heart Disease Neg Hx        History reviewed. No pertinent surgical history.    ROS   All systems reviewed are negative except for HPI       Objective:     Blood pressure 142/84, pulse 80, temperature 36.5 °C (97.7 °F), resp. rate 16, height 1.803 m (5' 11\"), weight 90.7 kg (200 lb), SpO2 97 %. Body mass index is 27.89 kg/m².   Physical Exam:  Constitutional: Alert, no distress.  Skin: Warm, dry, good turgor, as per above   Eye: Equal, round and reactive, conjunctiva clear, lids normal.  ENMT: Lips without lesions, good dentition, oropharynx clear.  Neck: Trachea midline, no masses, no thyromegaly. No cervical or supraclavicular lymphadenopathy  Respiratory: Unlabored respiratory effort, lungs clear to auscultation, no wheezes, no ronchi.  Cardiovascular: Normal S1, S2, no murmur, no edema.  Abdomen: Soft, non-tender, no masses, no hepatosplenomegaly.  Psych: Alert and oriented x3, normal affect and mood.        Assessment and Plan:   The following treatment plan was discussed    1. Essential hypertension  Increase lisinopril to 20 mg daily. Consider adding amlodipine   - lisinopril (PRINIVIL) 20 MG Tab; Take 1 Tab by mouth every day.  Dispense: 90 Tab; Refill: 3  - COMP METABOLIC PANEL; Future  - CBC WITH " DIFFERENTIAL; Future    2. Folliculitis  Trial with doxy. If not better. Consider to stop lisinopril and switch to amlodipine   - CBC WITH DIFFERENTIAL; Future    3. Mixed hyperlipidemia  Continue to monitor. Continue healthy lifestyle   - COMP METABOLIC PANEL; Future  - LIPID PROFILE; Future    4. Hypoglycemia  Continue to monitor   - HEMOGLOBIN A1C; Future      Followup: Return in about 2 months (around 9/24/2018), or if symptoms worsen or fail to improve, for Short.

## 2018-07-24 NOTE — ASSESSMENT & PLAN NOTE
Blood pressure still slightly elevated.  He reports compliant with medication, lisinopril 10 mg daily.  He denies chest pain, shortness of breath, lightheadedness or headache.  He has healthy lifestyle.  He eats healthy, exercising 3 times a week.  He does not know much about his family history.

## 2018-07-24 NOTE — ASSESSMENT & PLAN NOTE
He reports some blister, patchy erythematous lesion on his head, few of them with yellow spots as folliculitis. + itchy. He has had them for couple of months. 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 No fever. He has had some lesion on his arm. I have sent him to dermatology. He had skin biopsy and possible allergies.

## 2018-08-17 ENCOUNTER — HOSPITAL ENCOUNTER (OUTPATIENT)
Facility: MEDICAL CENTER | Age: 43
End: 2018-08-17
Attending: PHYSICIAN ASSISTANT
Payer: COMMERCIAL

## 2018-08-17 ENCOUNTER — OFFICE VISIT (OUTPATIENT)
Dept: MEDICAL GROUP | Facility: PHYSICIAN GROUP | Age: 43
End: 2018-08-17
Payer: COMMERCIAL

## 2018-08-17 VITALS
SYSTOLIC BLOOD PRESSURE: 102 MMHG | DIASTOLIC BLOOD PRESSURE: 78 MMHG | TEMPERATURE: 97.9 F | OXYGEN SATURATION: 98 % | RESPIRATION RATE: 16 BRPM | WEIGHT: 202 LBS | HEART RATE: 77 BPM | HEIGHT: 71 IN | BODY MASS INDEX: 28.28 KG/M2

## 2018-08-17 DIAGNOSIS — N30.01 ACUTE CYSTITIS WITH HEMATURIA: ICD-10-CM

## 2018-08-17 LAB
APPEARANCE UR: NORMAL
BILIRUB UR STRIP-MCNC: NEGATIVE MG/DL
COLOR UR AUTO: NORMAL
GLUCOSE UR STRIP.AUTO-MCNC: NEGATIVE MG/DL
KETONES UR STRIP.AUTO-MCNC: NORMAL MG/DL
LEUKOCYTE ESTERASE UR QL STRIP.AUTO: NORMAL
NITRITE UR QL STRIP.AUTO: POSITIVE
PH UR STRIP.AUTO: 5.5 [PH] (ref 5–8)
PROT UR QL STRIP: NORMAL MG/DL
RBC UR QL AUTO: NORMAL
SP GR UR STRIP.AUTO: >1.03
UROBILINOGEN UR STRIP-MCNC: 0.2 MG/DL

## 2018-08-17 PROCEDURE — 87086 URINE CULTURE/COLONY COUNT: CPT

## 2018-08-17 PROCEDURE — 99214 OFFICE O/P EST MOD 30 MIN: CPT | Performed by: PHYSICIAN ASSISTANT

## 2018-08-17 PROCEDURE — 87077 CULTURE AEROBIC IDENTIFY: CPT

## 2018-08-17 PROCEDURE — 81002 URINALYSIS NONAUTO W/O SCOPE: CPT | Performed by: PHYSICIAN ASSISTANT

## 2018-08-17 PROCEDURE — 87186 SC STD MICRODIL/AGAR DIL: CPT

## 2018-08-17 RX ORDER — CIPROFLOXACIN 500 MG/1
500 TABLET, FILM COATED ORAL EVERY 12 HOURS
Qty: 28 TAB | Refills: 0 | Status: SHIPPED | OUTPATIENT
Start: 2018-08-17 | End: 2018-08-31

## 2018-08-17 RX ORDER — PHENAZOPYRIDINE HYDROCHLORIDE 200 MG/1
200 TABLET, FILM COATED ORAL 3 TIMES DAILY
Qty: 6 TAB | Refills: 0 | Status: SHIPPED | OUTPATIENT
Start: 2018-08-17 | End: 2018-08-19

## 2018-08-17 ASSESSMENT — PAIN SCALES - GENERAL: PAINLEVEL: NO PAIN

## 2018-08-17 NOTE — ASSESSMENT & PLAN NOTE
Symptom onset: 3 days ago.  States he is in a monogamous relationship and has been sexually active with current partner for 1 month. He tells me that 4 days ago he twisted his left ankle and took over-the-counter Flanax for pain symptoms.  States UTI symptoms developed 1 day after taking medication.  Current symptoms: painful, urgent, frequent voids. No blood noted in urine.  He tells me that he has also been experiencing a subjective fever, nausea and abdominal pain. States he no longer experiencing abdominal pain or nausea.  Since onset symptoms are: States symptoms have improved today.  Treatments tried: He tells me he has been taking over-the-counter Tylenol with no alleviation of fever symptoms.  Denies OTC antispasm med.  Associated symptoms: negative for flank pain, vomiting, urethral discharge, foul-smelling discharge, pelvic pain, joint pain.

## 2018-08-17 NOTE — PROGRESS NOTES
Chief Complaint   Patient presents with   • Fever     x 2 days, trouble urinating       HISTORY OF PRESENT ILLNESS: Donnie Messina is an established 43 y.o. male here to discuss the evaluation and management of:    Acute cystitis with hematuria  Symptom onset: 3 days ago.  States he is in a monogamous relationship and has been sexually active with current partner for 1 month. He tells me that 4 days ago he twisted his left ankle and took over-the-counter Flanax for pain symptoms.  States UTI symptoms developed 1 day after taking medication.  Current symptoms: painful, urgent, frequent voids. No blood noted in urine.  He tells me that he has also been experiencing a subjective fever, nausea and abdominal pain. States he no longer experiencing abdominal pain or nausea.  Since onset symptoms are: States symptoms have improved today.  Treatments tried: He tells me he has been taking over-the-counter Tylenol with no alleviation of fever symptoms.  Denies OTC antispasm med.  Associated symptoms: negative for flank pain, vomiting, urethral discharge, foul-smelling discharge, pelvic pain, joint pain.        Patient Active Problem List    Diagnosis Date Noted   • Acute cystitis with hematuria 08/17/2018   • Folliculitis 07/24/2018   • Hypoglycemia 01/30/2018   • Eczema 11/20/2017   • Mixed hyperlipidemia 10/23/2017   • Essential hypertension 09/26/2017       Allergies:Patient has no known allergies.    Current Outpatient Prescriptions   Medication Sig Dispense Refill   • ciprofloxacin (CIPRO) 500 MG Tab Take 1 Tab by mouth every 12 hours for 14 days. 28 Tab 0   • phenazopyridine (PYRIDIUM) 200 MG Tab Take 1 Tab by mouth 3 times a day for 2 days. 6 Tab 0   • lisinopril (PRINIVIL) 20 MG Tab Take 1 Tab by mouth every day. 90 Tab 3   • triamcinolone acetonide (KENALOG) 0.1 % Cream Apply 1 Application to affected area(s) 2 times a day. 1 Tube 0     No current facility-administered medications for this visit.   "      Social History   Substance Use Topics   • Smoking status: Former Smoker     Packs/day: 0.25     Years: 8.00     Quit date: 1997   • Smokeless tobacco: Never Used   • Alcohol use No      Comment: occ       Family Status   Relation Status   • Mo Alive   • Fa    • Sis Alive   • Bro Alive   • MGMo    • MGFa    • PGMo    • PGFa    • Son Alive   • Son Alive   • Son Alive   • Neg Hx (Not Specified)     Family History   Problem Relation Age of Onset   • No Known Problems Mother    • No Known Problems Sister    • No Known Problems Brother    • No Known Problems Son    • No Known Problems Son    • No Known Problems Son    • Cancer Neg Hx    • Diabetes Neg Hx    • Heart Disease Neg Hx        ROS:  Review of Systems   Constitutional: Negative for chills, weight loss and malaise/fatigue.  Positive for subjective fever.  HENT: Negative for ear pain, nosebleeds, congestion, sore throat and neck pain.    Eyes: Negative for blurred vision.   Respiratory: Negative for cough, sputum production, shortness of breath and wheezing.    Cardiovascular: Negative for chest pain, palpitations, orthopnea and leg swelling.   Gastrointestinal: Negative for heartburn, vomiting and abdominal pain.  Positive for nausea.  Genitourinary: + for dysuria, urgency and frequency.   Musculoskeletal: Negative for myalgias, back pain and joint pain.   Skin: Negative for rash and itching.   Neurological: Negative for dizziness, tingling, tremors, sensory change, focal weakness and headaches.   Endo/Heme/Allergies: Does not bruise/bleed easily.   Psychiatric/Behavioral: Negative for depression, suicidal ideas and memory loss.  The patient is not nervous/anxious and does not have insomnia.    All other systems reviewed and are negative except as in HPI.    Exam: Blood pressure 102/78, pulse 77, temperature 36.6 °C (97.9 °F), resp. rate 16, height 1.803 m (5' 11\"), weight 91.6 kg (202 lb), SpO2 98 %. Body mass " index is 28.17 kg/m².  General: Normal appearing. No distress.  HEENT: Normocephalic. Eyes conjunctiva clear lids without ptosis and ears normal shape and contour.  Neck: Supple without JVD or bruit. Thyroid is not enlarged.  Pulmonary: Clear to ausculation.  Normal effort. No rales, ronchi, or wheezing.  Cardiovascular: Regular rate and rhythm without murmur.   Abdomen: Soft, nontender, nondistended. Normal bowel sounds. Liver and spleen are not palpable.  Negative for CVA tenderness.  Neurologic: Grossly nonfocal.  Cranial nerves are normal.   Lymph: No cervical, supraclavicular or axillary lymph nodes are palpable  Skin: Warm and dry.  No rashes or suspicious skin lesions.  Musculoskeletal: Normal gait. No extremity cyanosis, clubbing, or edema.  Psych: Normal mood and affect. Alert and oriented x3. Judgment and insight is normal.    Medical decision-making and discussion:  1. Acute cystitis with hematuria  Lab Results   Component Value Date/Time    POCCOLOR Hayde 08/17/2018 02:36 PM    POCAPPEAR Cloudy 08/17/2018 02:36 PM    POCLEUKEST moderate 08/17/2018 02:36 PM    POCNITRITE positive 08/17/2018 02:36 PM    POCUROBILIGE 0.2 08/17/2018 02:36 PM    POCPROTEIN 100 MG 08/17/2018 02:36 PM    POCURPH 5.5 08/17/2018 02:36 PM    POCBLOOD Moderate 08/17/2018 02:36 PM    POCSPGRV >1.030 08/17/2018 02:36 PM    POCKETONES Trace 08/17/2018 02:36 PM    POCBILIRUBIN negative 08/17/2018 02:36 PM    POCGLUCUA negative 08/17/2018 02:36 PM     POCT urinalysis results indicate urinary tract infection.  Urine culture has been ordered for further evaluation symptoms.  Patient has been prescribed Cipro 500 mg tab advised take 1 tab by mouth every 12 hours for 14 days.  Suggested taking over-the-counter probiotics due to potential gastrointestinal symptoms associated with antibiotic use.  Patient has also been prescribed Pyridium.  Advised patient to take 1 tab by mouth 3 times daily as needed for 2 days.  Advised patient that urine  will be yellow/orange in color when taking Pyridium.  Discussed common side effects and adverse reactions of medications with patient.    RTC if symptoms not improving within 3-4 days or in case of vomiting, fever, increasing pain.   EDUCATION: drink plenty of fluids, void before and after sex.  Advised patient to not hold the restroom.  Wipe front to back.    Discussed with patient if he develops a recurrent UTI an urology referral will need to be made at that time.    - Urine Culture; Future  - ciprofloxacin (CIPRO) 500 MG Tab; Take 1 Tab by mouth every 12 hours for 14 days.  Dispense: 28 Tab; Refill: 0  - phenazopyridine (PYRIDIUM) 200 MG Tab; Take 1 Tab by mouth 3 times a day for 2 days.  Dispense: 6 Tab; Refill: 0  - POCT Urinalysis      Please note that this dictation was created using voice recognition software. I have made every reasonable attempt to correct obvious errors, but I expect that there are errors of grammar and possibly content that I did not discover before finalizing the note.        Return if symptoms worsen or fail to improve.

## 2018-08-19 LAB
BACTERIA UR CULT: ABNORMAL
BACTERIA UR CULT: ABNORMAL
SIGNIFICANT IND 70042: ABNORMAL
SITE SITE: ABNORMAL
SOURCE SOURCE: ABNORMAL

## 2018-08-21 ENCOUNTER — TELEPHONE (OUTPATIENT)
Dept: MEDICAL GROUP | Facility: PHYSICIAN GROUP | Age: 43
End: 2018-08-21

## 2018-08-21 DIAGNOSIS — N30.01 ACUTE CYSTITIS WITH HEMATURIA: ICD-10-CM

## 2018-08-21 RX ORDER — SULFAMETHOXAZOLE AND TRIMETHOPRIM 800; 160 MG/1; MG/1
1 TABLET ORAL 2 TIMES DAILY
Qty: 28 TAB | Refills: 0 | Status: SHIPPED | OUTPATIENT
Start: 2018-08-21 | End: 2018-09-25

## 2018-08-21 NOTE — TELEPHONE ENCOUNTER
----- Message from Mylene Almanza P.A.-C. sent at 8/21/2018 12:54 PM PDT -----  Please call patient. I have reviewed labs and they are abnormal.  Patient tested positive for urinary tract infection.  Advise patient that the current antibiotic that he is taking is not effective and to discontinue using it.  Advise pt. a different antibiotic has been sent to his pharmacy.  Suggest taking over-the-counter probiotics to help alleviate potential gastrointestinal symptoms associated with antibiotic use.    Follow-up for worsening symptoms,lack of expected recovery, or should new symptoms or problems arise.      Thank you,    Shantelle ALTAMIRANO

## 2018-08-22 NOTE — TELEPHONE ENCOUNTER
Phone Number Called: [phone] (home)     Message: Unable to reach pt left vm to call back regarding results.     Left Message for patient to call back: yes

## 2018-08-22 NOTE — TELEPHONE ENCOUNTER
Results given to pt. pt states undertstanding the pt has no further questions or concerns. pt advised to call us for any other concerns

## 2018-09-24 ENCOUNTER — HOSPITAL ENCOUNTER (OUTPATIENT)
Dept: LAB | Facility: MEDICAL CENTER | Age: 43
End: 2018-09-24
Attending: INTERNAL MEDICINE
Payer: COMMERCIAL

## 2018-09-24 DIAGNOSIS — L73.9 FOLLICULITIS: ICD-10-CM

## 2018-09-24 DIAGNOSIS — I10 ESSENTIAL HYPERTENSION: ICD-10-CM

## 2018-09-24 DIAGNOSIS — E16.2 HYPOGLYCEMIA: ICD-10-CM

## 2018-09-24 DIAGNOSIS — E78.2 MIXED HYPERLIPIDEMIA: ICD-10-CM

## 2018-09-24 LAB
ALBUMIN SERPL BCP-MCNC: 4.1 G/DL (ref 3.2–4.9)
ALBUMIN/GLOB SERPL: 1.1 G/DL
ALP SERPL-CCNC: 57 U/L (ref 30–99)
ALT SERPL-CCNC: 23 U/L (ref 2–50)
ANION GAP SERPL CALC-SCNC: 7 MMOL/L (ref 0–11.9)
AST SERPL-CCNC: 18 U/L (ref 12–45)
BASOPHILS # BLD AUTO: 0.3 % (ref 0–1.8)
BASOPHILS # BLD: 0.02 K/UL (ref 0–0.12)
BILIRUB SERPL-MCNC: 0.4 MG/DL (ref 0.1–1.5)
BUN SERPL-MCNC: 20 MG/DL (ref 8–22)
CALCIUM SERPL-MCNC: 10 MG/DL (ref 8.5–10.5)
CHLORIDE SERPL-SCNC: 104 MMOL/L (ref 96–112)
CHOLEST SERPL-MCNC: 177 MG/DL (ref 100–199)
CO2 SERPL-SCNC: 27 MMOL/L (ref 20–33)
CREAT SERPL-MCNC: 1 MG/DL (ref 0.5–1.4)
EOSINOPHIL # BLD AUTO: 0.37 K/UL (ref 0–0.51)
EOSINOPHIL NFR BLD: 5.8 % (ref 0–6.9)
ERYTHROCYTE [DISTWIDTH] IN BLOOD BY AUTOMATED COUNT: 40.3 FL (ref 35.9–50)
FASTING STATUS PATIENT QL REPORTED: NORMAL
GLOBULIN SER CALC-MCNC: 3.6 G/DL (ref 1.9–3.5)
GLUCOSE SERPL-MCNC: 98 MG/DL (ref 65–99)
HCT VFR BLD AUTO: 47.4 % (ref 42–52)
HDLC SERPL-MCNC: 37 MG/DL
HGB BLD-MCNC: 14.7 G/DL (ref 14–18)
IMM GRANULOCYTES # BLD AUTO: 0.01 K/UL (ref 0–0.11)
IMM GRANULOCYTES NFR BLD AUTO: 0.2 % (ref 0–0.9)
LDLC SERPL CALC-MCNC: 119 MG/DL
LYMPHOCYTES # BLD AUTO: 1.7 K/UL (ref 1–4.8)
LYMPHOCYTES NFR BLD: 26.8 % (ref 22–41)
MCH RBC QN AUTO: 27.6 PG (ref 27–33)
MCHC RBC AUTO-ENTMCNC: 31 G/DL (ref 33.7–35.3)
MCV RBC AUTO: 89.1 FL (ref 81.4–97.8)
MONOCYTES # BLD AUTO: 0.35 K/UL (ref 0–0.85)
MONOCYTES NFR BLD AUTO: 5.5 % (ref 0–13.4)
NEUTROPHILS # BLD AUTO: 3.9 K/UL (ref 1.82–7.42)
NEUTROPHILS NFR BLD: 61.4 % (ref 44–72)
NRBC # BLD AUTO: 0 K/UL
NRBC BLD-RTO: 0 /100 WBC
PLATELET # BLD AUTO: 266 K/UL (ref 164–446)
PMV BLD AUTO: 9.3 FL (ref 9–12.9)
POTASSIUM SERPL-SCNC: 4.6 MMOL/L (ref 3.6–5.5)
PROT SERPL-MCNC: 7.7 G/DL (ref 6–8.2)
RBC # BLD AUTO: 5.32 M/UL (ref 4.7–6.1)
SODIUM SERPL-SCNC: 138 MMOL/L (ref 135–145)
TRIGL SERPL-MCNC: 107 MG/DL (ref 0–149)
WBC # BLD AUTO: 6.4 K/UL (ref 4.8–10.8)

## 2018-09-24 PROCEDURE — 36415 COLL VENOUS BLD VENIPUNCTURE: CPT

## 2018-09-24 PROCEDURE — 80053 COMPREHEN METABOLIC PANEL: CPT

## 2018-09-24 PROCEDURE — 83036 HEMOGLOBIN GLYCOSYLATED A1C: CPT

## 2018-09-24 PROCEDURE — 80061 LIPID PANEL: CPT

## 2018-09-24 PROCEDURE — 85025 COMPLETE CBC W/AUTO DIFF WBC: CPT

## 2018-09-25 ENCOUNTER — OFFICE VISIT (OUTPATIENT)
Dept: MEDICAL GROUP | Facility: PHYSICIAN GROUP | Age: 43
End: 2018-09-25
Payer: COMMERCIAL

## 2018-09-25 VITALS
BODY MASS INDEX: 28.7 KG/M2 | WEIGHT: 205 LBS | OXYGEN SATURATION: 98 % | RESPIRATION RATE: 16 BRPM | HEIGHT: 71 IN | DIASTOLIC BLOOD PRESSURE: 74 MMHG | HEART RATE: 61 BPM | TEMPERATURE: 97.7 F | SYSTOLIC BLOOD PRESSURE: 114 MMHG

## 2018-09-25 DIAGNOSIS — I10 ESSENTIAL HYPERTENSION: ICD-10-CM

## 2018-09-25 DIAGNOSIS — L73.9 FOLLICULITIS: ICD-10-CM

## 2018-09-25 DIAGNOSIS — K21.9 GASTROESOPHAGEAL REFLUX DISEASE, ESOPHAGITIS PRESENCE NOT SPECIFIED: ICD-10-CM

## 2018-09-25 DIAGNOSIS — Z23 NEED FOR VACCINATION: ICD-10-CM

## 2018-09-25 DIAGNOSIS — E78.2 MIXED HYPERLIPIDEMIA: ICD-10-CM

## 2018-09-25 DIAGNOSIS — E16.2 HYPOGLYCEMIA: ICD-10-CM

## 2018-09-25 PROBLEM — N30.01 ACUTE CYSTITIS WITH HEMATURIA: Status: RESOLVED | Noted: 2018-08-17 | Resolved: 2018-09-25

## 2018-09-25 PROBLEM — L30.9 ECZEMA: Status: RESOLVED | Noted: 2017-11-20 | Resolved: 2018-09-25

## 2018-09-25 PROCEDURE — 90471 IMMUNIZATION ADMIN: CPT | Performed by: INTERNAL MEDICINE

## 2018-09-25 PROCEDURE — 99214 OFFICE O/P EST MOD 30 MIN: CPT | Mod: 25 | Performed by: INTERNAL MEDICINE

## 2018-09-25 PROCEDURE — 90686 IIV4 VACC NO PRSV 0.5 ML IM: CPT | Performed by: INTERNAL MEDICINE

## 2018-09-25 RX ORDER — OMEPRAZOLE 20 MG/1
20 CAPSULE, DELAYED RELEASE ORAL DAILY
Qty: 60 CAP | Refills: 1 | Status: SHIPPED | OUTPATIENT
Start: 2018-09-25 | End: 2023-08-30

## 2018-09-25 RX ORDER — DOXYCYCLINE HYCLATE 100 MG
100 TABLET ORAL DAILY
Qty: 60 TAB | Refills: 0 | Status: SHIPPED | OUTPATIENT
Start: 2018-09-25 | End: 2023-08-30

## 2018-09-25 ASSESSMENT — PATIENT HEALTH QUESTIONNAIRE - PHQ9: CLINICAL INTERPRETATION OF PHQ2 SCORE: 0

## 2018-09-25 NOTE — ASSESSMENT & PLAN NOTE
He still has lesions on his head, blisters like, slight inflamed, painful when he goes to sleep. Bactrim resolved them, but start to reoccur again after antibiotic use. No discharges. No change on shampoo, or detergent

## 2018-09-25 NOTE — ASSESSMENT & PLAN NOTE
BP well controled on lisinopril 20 mg daily. He denies dry cough, side effects from current regime. He reports compliance with medication.  He denies chest pain, shortness of breath, leg swelling, blurry vision, lightheadedness or palpitations

## 2018-09-25 NOTE — PROGRESS NOTES
Subjective:   Donnie Webber Jaqueliner is a 43 y.o. male here today for hypertension, skin lesion, lab work review, acid reflux    Folliculitis  He still has lesions on his head, blisters like, slight inflamed, painful when he goes to sleep. Bactrim resolved them, but start to reoccur again after antibiotic use. No discharges. No change on shampoo, or detergent     GERD (gastroesophageal reflux disease)  He reports lately worse acid reflux, if he has a soda. He describes burning sensation retrosternal area.  He denies hematochezia, melanotic stool, unintentional weight loss, dysphagia.  He tried Tums and seems to help    Hypoglycemia  a1c pending, no hypoglycemia on this lab work. Continue to monitor     Mixed hyperlipidemia  LDL better this time. -->118. HDL still low. He states that he is trying to eat better, smoothies. Continue to monitor     Essential hypertension  BP well controled on lisinopril 20 mg daily. He denies dry cough, side effects from current regime. He reports compliance with medication.  He denies chest pain, shortness of breath, leg swelling, blurry vision, lightheadedness or palpitations       Current medicines (including changes today)  Current Outpatient Prescriptions   Medication Sig Dispense Refill   • omeprazole (PRILOSEC) 20 MG delayed-release capsule Take 1 Cap by mouth every day. 60 Cap 1   • doxycycline (VIBRAMYCIN) 100 MG Tab Take 1 Tab by mouth every day. 60 Tab 0   • lisinopril (PRINIVIL) 20 MG Tab Take 1 Tab by mouth every day. 90 Tab 3     No current facility-administered medications for this visit.      He  has no past medical history on file.    Current Outpatient Prescriptions   Medication Sig Dispense Refill   • omeprazole (PRILOSEC) 20 MG delayed-release capsule Take 1 Cap by mouth every day. 60 Cap 1   • doxycycline (VIBRAMYCIN) 100 MG Tab Take 1 Tab by mouth every day. 60 Tab 0   • lisinopril (PRINIVIL) 20 MG Tab Take 1 Tab by mouth every day. 90 Tab 3     No  "current facility-administered medications for this visit.        Allergies as of 09/25/2018   • (No Known Allergies)       Social History     Social History   • Marital status: Single     Spouse name: N/A   • Number of children: N/A   • Years of education: N/A     Occupational History   • Not on file.     Social History Main Topics   • Smoking status: Former Smoker     Packs/day: 0.25     Years: 8.00     Quit date: 1/1/1997   • Smokeless tobacco: Never Used   • Alcohol use No      Comment: occ   • Drug use: No   • Sexual activity: Yes     Partners: Female      Comment: In relationship. 3 children.      Other Topics Concern   • Not on file     Social History Narrative    ** Merged History Encounter **             Family History   Problem Relation Age of Onset   • No Known Problems Mother    • No Known Problems Sister    • No Known Problems Brother    • No Known Problems Son    • No Known Problems Son    • No Known Problems Son    • Cancer Neg Hx    • Diabetes Neg Hx    • Heart Disease Neg Hx        No past surgical history on file.    ROS   All systems reviewed are negative except for HPI       Objective:     Blood pressure 114/74, pulse 61, temperature 36.5 °C (97.7 °F), temperature source Temporal, resp. rate 16, height 1.803 m (5' 11\"), weight 93 kg (205 lb), SpO2 98 %. Body mass index is 28.59 kg/m².   Physical Exam:  Constitutional: Alert, no distress.  Skin: Warm, dry, good turgor. folliculitis as per above   Eye: Equal, round and reactive, conjunctiva clear, lids normal.  ENMT: Lips without lesions, good dentition, oropharynx clear.  Neck: Trachea midline, no masses, no thyromegaly. No cervical or supraclavicular lymphadenopathy  Respiratory: Unlabored respiratory effort, lungs clear to auscultation, no wheezes, no ronchi.  Cardiovascular: Normal S1, S2, no murmur, no edema.  Abdomen: Soft, non-tender, no masses, no hepatosplenomegaly.  Psych: Alert and oriented x3, normal affect and mood.        Assessment " and Plan:   The following treatment plan was discussed    1. Need for vaccination  - Influenza Vaccine Quad Injection >3Y (PF)    2. Gastroesophageal reflux disease, esophagitis presence not specified  I did advise patient to try omeprazole 20 mg capsule daily for 2 months.  If symptoms persist and will consider GI referral for upper endoscopy.  There is no red flag at this point for urgent gastroenterologist consulted  - omeprazole (PRILOSEC) 20 MG delayed-release capsule; Take 1 Cap by mouth every day.  Dispense: 60 Cap; Refill: 1  - CBC WITH DIFFERENTIAL; Future    3. Folliculitis  Continue doxycycline 100 mg daily for 2 months.  Follow-up with dermatologist if symptoms will recur  - doxycycline (VIBRAMYCIN) 100 MG Tab; Take 1 Tab by mouth every day.  Dispense: 60 Tab; Refill: 0  - REFERRAL TO DERMATOLOGY  - CBC WITH DIFFERENTIAL; Future    4. Essential hypertension  Continue same treatment.  Continue to monitor  - COMP METABOLIC PANEL; Future  - LIPID PROFILE; Future    5. Mixed hyperlipidemia  Continue to monitor.  Continue healthy lifestyle  - COMP METABOLIC PANEL; Future  - LIPID PROFILE; Future    6. Hypoglycemia  Continue to monitor  - COMP METABOLIC PANEL; Future  - HEMOGLOBIN A1C; Future      Followup: Return in about 6 months (around 3/25/2019), or if symptoms worsen or fail to improve, for Short, follow up on HTN, medication management, follow up on lab review.

## 2018-09-25 NOTE — ASSESSMENT & PLAN NOTE
LDL better this time. -->118. HDL still low. He states that he is trying to eat better, smoothies. Continue to monitor

## 2018-09-25 NOTE — ASSESSMENT & PLAN NOTE
He reports lately worse acid reflux, if he has a soda. He describes burning sensation retrosternal area.  He denies hematochezia, melanotic stool, unintentional weight loss, dysphagia.  He tried Tums and seems to help

## 2018-09-27 ENCOUNTER — TELEPHONE (OUTPATIENT)
Dept: MEDICAL GROUP | Facility: PHYSICIAN GROUP | Age: 43
End: 2018-09-27

## 2018-09-27 LAB
EST. AVERAGE GLUCOSE BLD GHB EST-MCNC: 114 MG/DL
HBA1C MFR BLD: 5.6 % (ref 0–5.6)

## 2018-09-27 NOTE — TELEPHONE ENCOUNTER
----- Message from Eligio Gilmore M.D. sent at 9/27/2018  4:34 PM PDT -----  Please let pt knows that sugar average a1c is normal. No signs of diabetes. Continue healthy lifestyle.  Thank you,  Eligio Gilmore M.D.

## 2018-09-27 NOTE — TELEPHONE ENCOUNTER
Phone Number Called: [phone] (home)     Message: LVM to call back.     Left Message for patient to call back: yes

## 2018-10-01 NOTE — TELEPHONE ENCOUNTER
Phone Number Called: [phone] (home)     Message: LVM to call back.     Left Message for patient to call back: yes

## 2018-12-22 ENCOUNTER — HOSPITAL ENCOUNTER (OUTPATIENT)
Dept: RADIOLOGY | Facility: MEDICAL CENTER | Age: 43
End: 2018-12-22
Attending: CHIROPRACTOR
Payer: COMMERCIAL

## 2018-12-22 DIAGNOSIS — M54.17 LUMBOSACRAL RADICULOPATHY: ICD-10-CM

## 2018-12-22 PROCEDURE — 72148 MRI LUMBAR SPINE W/O DYE: CPT

## 2020-05-16 NOTE — ASSESSMENT & PLAN NOTE
He suppose to be on lisinopril 10 mg daily. He denies side effects from that. he does not check blood pressure at home. He denies chest pain, shortness of breath, leg swelling, palpitations or diaphoresis.   no overt difficulties managing secretions

## 2023-08-30 ENCOUNTER — OFFICE VISIT (OUTPATIENT)
Dept: MEDICAL GROUP | Facility: MEDICAL CENTER | Age: 48
End: 2023-08-30
Payer: COMMERCIAL

## 2023-08-30 VITALS
HEIGHT: 71 IN | WEIGHT: 206.4 LBS | RESPIRATION RATE: 16 BRPM | DIASTOLIC BLOOD PRESSURE: 82 MMHG | TEMPERATURE: 97.1 F | SYSTOLIC BLOOD PRESSURE: 152 MMHG | HEART RATE: 52 BPM | BODY MASS INDEX: 28.9 KG/M2 | OXYGEN SATURATION: 98 %

## 2023-08-30 DIAGNOSIS — N40.1 BENIGN PROSTATIC HYPERPLASIA WITH URINARY FREQUENCY: ICD-10-CM

## 2023-08-30 DIAGNOSIS — R35.0 BENIGN PROSTATIC HYPERPLASIA WITH URINARY FREQUENCY: ICD-10-CM

## 2023-08-30 DIAGNOSIS — K21.9 GASTROESOPHAGEAL REFLUX DISEASE: ICD-10-CM

## 2023-08-30 DIAGNOSIS — Z11.59 NEED FOR HEPATITIS C SCREENING TEST: ICD-10-CM

## 2023-08-30 DIAGNOSIS — I10 UNCONTROLLED HYPERTENSION: Primary | ICD-10-CM

## 2023-08-30 DIAGNOSIS — Z12.11 SCREENING FOR COLORECTAL CANCER: ICD-10-CM

## 2023-08-30 DIAGNOSIS — H04.129 DRY EYE: ICD-10-CM

## 2023-08-30 DIAGNOSIS — K21.9 GASTROESOPHAGEAL REFLUX DISEASE WITHOUT ESOPHAGITIS: ICD-10-CM

## 2023-08-30 DIAGNOSIS — Z12.12 SCREENING FOR COLORECTAL CANCER: ICD-10-CM

## 2023-08-30 DIAGNOSIS — E78.2 MIXED HYPERLIPIDEMIA: ICD-10-CM

## 2023-08-30 PROBLEM — M51.26 DISPLACEMENT OF LUMBAR INTERVERTEBRAL DISC WITHOUT MYELOPATHY: Status: ACTIVE | Noted: 2023-08-30

## 2023-08-30 PROBLEM — L73.9 FOLLICULITIS: Status: RESOLVED | Noted: 2018-07-24 | Resolved: 2023-08-30

## 2023-08-30 PROCEDURE — 3079F DIAST BP 80-89 MM HG: CPT | Performed by: STUDENT IN AN ORGANIZED HEALTH CARE EDUCATION/TRAINING PROGRAM

## 2023-08-30 PROCEDURE — 3077F SYST BP >= 140 MM HG: CPT | Performed by: STUDENT IN AN ORGANIZED HEALTH CARE EDUCATION/TRAINING PROGRAM

## 2023-08-30 PROCEDURE — 99204 OFFICE O/P NEW MOD 45 MIN: CPT | Performed by: STUDENT IN AN ORGANIZED HEALTH CARE EDUCATION/TRAINING PROGRAM

## 2023-08-30 RX ORDER — LISINOPRIL 20 MG/1
20 TABLET ORAL DAILY
Qty: 30 TABLET | Refills: 3 | Status: SHIPPED | OUTPATIENT
Start: 2023-08-30 | End: 2023-09-28 | Stop reason: SINTOL

## 2023-08-30 RX ORDER — TAMSULOSIN HYDROCHLORIDE 0.4 MG/1
0.4 CAPSULE ORAL
Qty: 30 CAPSULE | Refills: 2 | Status: SHIPPED | OUTPATIENT
Start: 2023-08-30

## 2023-08-30 RX ORDER — OMEPRAZOLE 20 MG/1
20 CAPSULE, DELAYED RELEASE ORAL
Qty: 30 CAPSULE | Refills: 0 | Status: SHIPPED | OUTPATIENT
Start: 2023-08-30

## 2023-08-30 RX ORDER — POLYVINYL ALCOHOL, POVIDONE 14; 6 MG/ML; MG/ML
1 SOLUTION/ DROPS OPHTHALMIC PRN
Qty: 30 ML | Refills: 0 | Status: SHIPPED | OUTPATIENT
Start: 2023-08-30

## 2023-08-30 ASSESSMENT — ENCOUNTER SYMPTOMS
BLOOD IN STOOL: 0
DIZZINESS: 0
FEVER: 0
BLURRED VISION: 0
DEPRESSION: 0
COUGH: 0
FALLS: 0
MYALGIAS: 0
SHORTNESS OF BREATH: 0
NAUSEA: 0
HEADACHES: 0
SORE THROAT: 0
ABDOMINAL PAIN: 0
WHEEZING: 0
PALPITATIONS: 0

## 2023-08-30 ASSESSMENT — PATIENT HEALTH QUESTIONNAIRE - PHQ9: CLINICAL INTERPRETATION OF PHQ2 SCORE: 0

## 2023-08-30 NOTE — PROGRESS NOTES
Subjective:     CC:  Diagnoses of Screening for colorectal cancer, Essential hypertension, Benign prostatic hyperplasia with urinary frequency, Gastroesophageal reflux disease without esophagitis, Gastroesophageal reflux disease, Mixed hyperlipidemia, Need for hepatitis C screening test, and Dry eye were pertinent to this visit.    HISTORY OF THE PRESENT ILLNESS: Patient is a 48 y.o. male. This pleasant patient is here today to establish care and discuss difficulty urination and increased frequency.      Problem   Displacement of Lumbar Intervertebral Disc Without Myelopathy   Benign Prostatic Hyperplasia With Urinary Frequency    This is a new problem  Patient reports having increased urinary frequency, nocturia with 3-4 episodes of nighttime awakening, hesitancy, feeling of incomplete bladder emptying.     Gerd (Gastroesophageal Reflux Disease)    Improved   Not on any medications   Diet controlled   Uses omeprazole 20 mg as needed      Mixed Hyperlipidemia    History of hyperlipidemia.  Diet-controlled  Lab Results   Component Value Date/Time    CHOLSTRLTOT 177 09/24/2018 1046    TRIGLYCERIDE 107 09/24/2018 1046    HDL 37 (A) 09/24/2018 1046     (H) 09/24/2018 1046          Essential Hypertension    Chronic, unstable  Patient was previously on lisinopril 20 mg daily for blood pressure control.  He states that he stopped taking the medications for almost 2 years since he has not seen a doctor.  His blood pressure today was high 152/82.  Denies chest pain, SOB, dizziness or vision changes.      Folliculitis (Resolved)       Health Maintenance: Completed    ROS:   Review of Systems   Constitutional:  Negative for fever and malaise/fatigue.   HENT:  Negative for congestion and sore throat.    Eyes:  Negative for blurred vision.   Respiratory:  Negative for cough, shortness of breath and wheezing.    Cardiovascular:  Negative for chest pain, palpitations and leg swelling.   Gastrointestinal:  Negative for  "abdominal pain, blood in stool and nausea.   Musculoskeletal:  Negative for falls and myalgias.   Neurological:  Negative for dizziness and headaches.   Psychiatric/Behavioral:  Negative for depression and suicidal ideas.          Please see HPI for additional ROS.    Objective:       Exam: BP (!) 152/82 (BP Location: Left arm, Patient Position: Sitting, BP Cuff Size: Large adult long)   Pulse (!) 52   Temp 36.2 °C (97.1 °F) (Temporal)   Resp 16   Ht 1.803 m (5' 11\")   Wt 93.6 kg (206 lb 6.4 oz)   SpO2 98%  Body mass index is 28.79 kg/m².    Physical Exam  Constitutional:       General: He is not in acute distress.     Appearance: Normal appearance.   HENT:      Head: Normocephalic.      Mouth/Throat:      Mouth: Mucous membranes are moist.      Pharynx: Oropharynx is clear.   Eyes:      Conjunctiva/sclera: Conjunctivae normal.   Cardiovascular:      Rate and Rhythm: Normal rate and regular rhythm.      Pulses: Normal pulses.      Heart sounds: Normal heart sounds.   Pulmonary:      Effort: Pulmonary effort is normal.      Breath sounds: Normal breath sounds.   Abdominal:      General: Bowel sounds are normal.      Palpations: Abdomen is soft.   Skin:     General: Skin is warm.   Neurological:      Mental Status: He is alert and oriented to person, place, and time.   Psychiatric:         Mood and Affect: Mood normal.         Behavior: Behavior normal.       Reviewed labs      Assessment & Plan:   48 y.o. male with the following -      1. Essential hypertension  Chronic, uncontrolled    Patient with history of chronic hypertension.  Reports that he is off medications for 2 years.  His blood pressure is uncontrolled  Plan  Education and counseling provided.  Recommended to restart blood pressure medications.  Patient agrees with the plan  We will start lisinopril 20 mg daily  Check blood pressure at home and maintain a log  Low-salt diet, DASH diet recommended  - lisinopril (PRINIVIL) 20 MG Tab; Take 1 Tablet by " mouth every day.  Dispense: 30 Tablet; Refill: 3  - Comp Metabolic Panel; Future  - TSH WITH REFLEX TO FT4; Future  - HEMOGLOBIN A1C; Future    2. Screening for colorectal cancer  - Cologuard Colon Cancer Screening    3. Benign prostatic hyperplasia with urinary frequency  This is a new problem    Urinary frequency and hesitancy most likely due to BPH.  We will check PSA levels.  We will start him on Flomax 0.4 mg daily    - PSA TOTAL + %FREE; Future  - tamsulosin (FLOMAX) 0.4 MG capsule; Take 1 Capsule by mouth 1/2 hour after breakfast.  Dispense: 30 Capsule; Refill: 2    4. Gastroesophageal reflux disease without esophagitis  Chronic, stable with intermittent episodes of heartburn.  Recommended Prilosec 20 mg at bedtime as needed  - omeprazole (PRILOSEC) 20 MG delayed-release capsule; Take 1 Capsule by mouth 1 time a day as needed (heartburn).  Dispense: 30 Capsule; Refill: 0  - CBC WITHOUT DIFFERENTIAL; Future      5. Mixed hyperlipidemia  Chronic, patient not on any medication.  Was diet controlled  We will get new labs done  - Lipid Profile; Future  - HEMOGLOBIN A1C; Future    7. Need for hepatitis C screening test  - HEP C VIRUS ANTIBODY; Future    8. Dry eye  - polyvinyl alcohol-povidone (REFRESH) 1.4-0.6 % ophthalmic solution; Administer 1 Drop into the left eye as needed (dry eye).  Dispense: 30 mL; Refill: 0      Return in about 3 weeks (around 9/20/2023) for labs f/u, hypertension.    Please note that this dictation was created using voice recognition software. I have made every reasonable attempt to correct obvious errors, but I expect that there are errors of grammar and possibly content that I did not discover before finalizing the note.

## 2023-08-31 ENCOUNTER — HOSPITAL ENCOUNTER (OUTPATIENT)
Dept: LAB | Facility: MEDICAL CENTER | Age: 48
End: 2023-08-31
Attending: STUDENT IN AN ORGANIZED HEALTH CARE EDUCATION/TRAINING PROGRAM
Payer: COMMERCIAL

## 2023-08-31 DIAGNOSIS — Z11.59 NEED FOR HEPATITIS C SCREENING TEST: ICD-10-CM

## 2023-08-31 DIAGNOSIS — I10 UNCONTROLLED HYPERTENSION: ICD-10-CM

## 2023-08-31 DIAGNOSIS — R35.0 BENIGN PROSTATIC HYPERPLASIA WITH URINARY FREQUENCY: ICD-10-CM

## 2023-08-31 DIAGNOSIS — K21.9 GASTROESOPHAGEAL REFLUX DISEASE WITHOUT ESOPHAGITIS: ICD-10-CM

## 2023-08-31 DIAGNOSIS — E78.2 MIXED HYPERLIPIDEMIA: ICD-10-CM

## 2023-08-31 DIAGNOSIS — N40.1 BENIGN PROSTATIC HYPERPLASIA WITH URINARY FREQUENCY: ICD-10-CM

## 2023-08-31 LAB
ALBUMIN SERPL BCP-MCNC: 4.5 G/DL (ref 3.2–4.9)
ALBUMIN/GLOB SERPL: 1.5 G/DL
ALP SERPL-CCNC: 65 U/L (ref 30–99)
ALT SERPL-CCNC: 42 U/L (ref 2–50)
ANION GAP SERPL CALC-SCNC: 10 MMOL/L (ref 7–16)
AST SERPL-CCNC: 32 U/L (ref 12–45)
BILIRUB SERPL-MCNC: 0.3 MG/DL (ref 0.1–1.5)
BUN SERPL-MCNC: 25 MG/DL (ref 8–22)
CALCIUM ALBUM COR SERPL-MCNC: 9.6 MG/DL (ref 8.5–10.5)
CALCIUM SERPL-MCNC: 10 MG/DL (ref 8.5–10.5)
CHLORIDE SERPL-SCNC: 102 MMOL/L (ref 96–112)
CHOLEST SERPL-MCNC: 201 MG/DL (ref 100–199)
CO2 SERPL-SCNC: 24 MMOL/L (ref 20–33)
CREAT SERPL-MCNC: 1.01 MG/DL (ref 0.5–1.4)
ERYTHROCYTE [DISTWIDTH] IN BLOOD BY AUTOMATED COUNT: 45.1 FL (ref 35.9–50)
EST. AVERAGE GLUCOSE BLD GHB EST-MCNC: 105 MG/DL
FASTING STATUS PATIENT QL REPORTED: NORMAL
GFR SERPLBLD CREATININE-BSD FMLA CKD-EPI: 90 ML/MIN/1.73 M 2
GLOBULIN SER CALC-MCNC: 3.1 G/DL (ref 1.9–3.5)
GLUCOSE SERPL-MCNC: 90 MG/DL (ref 65–99)
HBA1C MFR BLD: 5.3 % (ref 4–5.6)
HCT VFR BLD AUTO: 47 % (ref 42–52)
HCV AB SER QL: NORMAL
HDLC SERPL-MCNC: 42 MG/DL
HGB BLD-MCNC: 14.7 G/DL (ref 14–18)
LDLC SERPL CALC-MCNC: 140 MG/DL
MCH RBC QN AUTO: 28.1 PG (ref 27–33)
MCHC RBC AUTO-ENTMCNC: 31.3 G/DL (ref 32.3–36.5)
MCV RBC AUTO: 89.9 FL (ref 81.4–97.8)
PLATELET # BLD AUTO: 277 K/UL (ref 164–446)
PMV BLD AUTO: 9.6 FL (ref 9–12.9)
POTASSIUM SERPL-SCNC: 4.7 MMOL/L (ref 3.6–5.5)
PROT SERPL-MCNC: 7.6 G/DL (ref 6–8.2)
RBC # BLD AUTO: 5.23 M/UL (ref 4.7–6.1)
SODIUM SERPL-SCNC: 136 MMOL/L (ref 135–145)
TRIGL SERPL-MCNC: 97 MG/DL (ref 0–149)
TSH SERPL DL<=0.005 MIU/L-ACNC: 2.09 UIU/ML (ref 0.38–5.33)
WBC # BLD AUTO: 6.1 K/UL (ref 4.8–10.8)

## 2023-08-31 PROCEDURE — 84443 ASSAY THYROID STIM HORMONE: CPT

## 2023-08-31 PROCEDURE — 85027 COMPLETE CBC AUTOMATED: CPT

## 2023-08-31 PROCEDURE — 80061 LIPID PANEL: CPT

## 2023-08-31 PROCEDURE — 86803 HEPATITIS C AB TEST: CPT

## 2023-08-31 PROCEDURE — 83036 HEMOGLOBIN GLYCOSYLATED A1C: CPT

## 2023-08-31 PROCEDURE — 36415 COLL VENOUS BLD VENIPUNCTURE: CPT

## 2023-08-31 PROCEDURE — 84153 ASSAY OF PSA TOTAL: CPT

## 2023-08-31 PROCEDURE — 80053 COMPREHEN METABOLIC PANEL: CPT

## 2023-08-31 PROCEDURE — 84154 ASSAY OF PSA FREE: CPT

## 2023-09-02 LAB
PSA FREE MFR SERPL: 33 %
PSA FREE SERPL-MCNC: 0.2 NG/ML
PSA SERPL-MCNC: 0.6 NG/ML (ref 0–4)

## 2023-09-28 ENCOUNTER — OFFICE VISIT (OUTPATIENT)
Dept: MEDICAL GROUP | Facility: MEDICAL CENTER | Age: 48
End: 2023-09-28
Payer: COMMERCIAL

## 2023-09-28 VITALS
TEMPERATURE: 97.9 F | WEIGHT: 211.42 LBS | HEART RATE: 53 BPM | BODY MASS INDEX: 29.6 KG/M2 | RESPIRATION RATE: 18 BRPM | HEIGHT: 71 IN | OXYGEN SATURATION: 98 % | DIASTOLIC BLOOD PRESSURE: 84 MMHG | SYSTOLIC BLOOD PRESSURE: 134 MMHG

## 2023-09-28 DIAGNOSIS — I10 PRIMARY HYPERTENSION: ICD-10-CM

## 2023-09-28 DIAGNOSIS — Z23 NEED FOR VACCINATION: ICD-10-CM

## 2023-09-28 DIAGNOSIS — I10 ESSENTIAL HYPERTENSION: ICD-10-CM

## 2023-09-28 DIAGNOSIS — E78.2 MIXED HYPERLIPIDEMIA: ICD-10-CM

## 2023-09-28 DIAGNOSIS — T46.4X5A ADVERSE EFFECT OF LISINOPRIL, INITIAL ENCOUNTER: ICD-10-CM

## 2023-09-28 PROCEDURE — 90686 IIV4 VACC NO PRSV 0.5 ML IM: CPT | Performed by: STUDENT IN AN ORGANIZED HEALTH CARE EDUCATION/TRAINING PROGRAM

## 2023-09-28 PROCEDURE — 3075F SYST BP GE 130 - 139MM HG: CPT | Performed by: STUDENT IN AN ORGANIZED HEALTH CARE EDUCATION/TRAINING PROGRAM

## 2023-09-28 PROCEDURE — 99214 OFFICE O/P EST MOD 30 MIN: CPT | Mod: 25 | Performed by: STUDENT IN AN ORGANIZED HEALTH CARE EDUCATION/TRAINING PROGRAM

## 2023-09-28 PROCEDURE — 3079F DIAST BP 80-89 MM HG: CPT | Performed by: STUDENT IN AN ORGANIZED HEALTH CARE EDUCATION/TRAINING PROGRAM

## 2023-09-28 PROCEDURE — 90471 IMMUNIZATION ADMIN: CPT | Performed by: STUDENT IN AN ORGANIZED HEALTH CARE EDUCATION/TRAINING PROGRAM

## 2023-09-28 RX ORDER — LOSARTAN POTASSIUM 50 MG/1
50 TABLET ORAL DAILY
Qty: 30 TABLET | Refills: 2 | Status: SHIPPED | OUTPATIENT
Start: 2023-09-28

## 2023-09-28 RX ORDER — ATORVASTATIN CALCIUM 10 MG/1
10 TABLET, FILM COATED ORAL NIGHTLY
Qty: 30 TABLET | Refills: 2 | Status: SHIPPED | OUTPATIENT
Start: 2023-09-28

## 2023-09-28 ASSESSMENT — FIBROSIS 4 INDEX: FIB4 SCORE: 0.89

## 2023-09-28 NOTE — PROGRESS NOTES
"Subjective:     CC: BP follow up , refill of medications    HPI:   Donnie presents today with      Problem   Mixed Hyperlipidemia    Lab Results   Component Value Date/Time    CHOLSTRLTOT 201 (H) 08/31/2023 0852    TRIGLYCERIDE 97 08/31/2023 0852    HDL 42 08/31/2023 0852     (H) 08/31/2023 0852     The 10-year ASCVD risk score (Shayan BROOKS, et al., 2019) is: 5.3%    Currently on Lipitor 10 mg daily      Essential Hypertension    Chronic, unstable  Patient on  lisinopril 20 mg daily for blood pressure control.  He BP is stable but has noticed having dry cough since he restarted taking lisinopril.  Denies chest pain, SOB, dizziness or vision changes.          Health Maintenance: Completed    ROS:  Review of Systems   Constitutional:  Negative for fever and malaise/fatigue.   HENT:  Negative for congestion and sore throat.    Eyes:  Negative for blurred vision.   Respiratory:  Positive for cough. Negative for shortness of breath and wheezing.    Cardiovascular:  Negative for chest pain, palpitations and leg swelling.   Gastrointestinal:  Negative for blood in stool, heartburn and nausea.   Genitourinary:  Negative for dysuria and urgency.   Musculoskeletal:  Negative for falls and myalgias.   Neurological:  Negative for dizziness and headaches.   Psychiatric/Behavioral:  Negative for depression and suicidal ideas.        Review of systems unremarkable except for concerns noted by patient or items listed.    Please see HPI for additional ROS.      Objective:     Exam:  /84 (BP Location: Left arm, Patient Position: Sitting, BP Cuff Size: Large adult long)   Pulse (!) 53   Temp 36.6 °C (97.9 °F) (Temporal)   Resp 18   Ht 1.803 m (5' 11\") Comment: Pt reported  Wt 95.9 kg (211 lb 6.7 oz)   SpO2 98%   BMI 29.49 kg/m²  Body mass index is 29.49 kg/m².    Physical Exam  Constitutional:       General: He is not in acute distress.     Appearance: Normal appearance.   HENT:      Head: Normocephalic. "   Cardiovascular:      Rate and Rhythm: Normal rate and regular rhythm.      Pulses: Normal pulses.      Heart sounds: Normal heart sounds.   Pulmonary:      Effort: Pulmonary effort is normal.      Breath sounds: Normal breath sounds.   Skin:     General: Skin is warm.   Neurological:      Mental Status: He is alert and oriented to person, place, and time.   Psychiatric:         Mood and Affect: Mood normal.         Behavior: Behavior normal.             Labs: reviewed    Assessment & Plan:     48 y.o. male with the following -       1. Adverse effect of lisinopril, initial encounter  Acute, new problem  Patient reports having dry cough since started on lisinopril .  Plan:  Discontinue medication and changed to a different antihypertensive. Lisinopril added to allergy list    2. Mixed hyperlipidemia  Chronic, stable   Continue lipitor 10 mg daily  - atorvastatin (LIPITOR) 10 MG Tab; Take 1 Tablet by mouth every evening.  Dispense: 30 Tablet; Refill: 2    3. Primary hypertension  Chronic,stable   Lisinopril discontinued today due to side effect  Will start on losartan 50 mg daily   Continue to check BP at least 3 times a week   Healthy low salt diet and regular exercise is recommended  - losartan (COZAAR) 50 MG Tab; Take 1 Tablet by mouth every day.  Dispense: 30 Tablet; Refill: 2    4. Need for vaccination  - INFLUENZA VACCINE QUAD INJ (PF)      I spent a total of 30 minutes with record review, exam, communication with the patient, communication with other providers, and documentation of this encounter.      Return in about 3 months (around 12/28/2023) for chronic problems, hypertension, hyperlipidemia, medications check.    Please note that this dictation was created using voice recognition software. I have made every reasonable attempt to correct obvious errors, but I expect that there are errors of grammar and possibly content that I did not discover before finalizing the note.

## 2023-09-30 ASSESSMENT — ENCOUNTER SYMPTOMS
BLOOD IN STOOL: 0
WHEEZING: 0
BLURRED VISION: 0
SORE THROAT: 0
MYALGIAS: 0
HEARTBURN: 0
FALLS: 0
PALPITATIONS: 0
DIZZINESS: 0
HEADACHES: 0
FEVER: 0
COUGH: 1
NAUSEA: 0
DEPRESSION: 0
SHORTNESS OF BREATH: 0

## 2024-03-13 ENCOUNTER — OFFICE VISIT (OUTPATIENT)
Dept: MEDICAL GROUP | Facility: MEDICAL CENTER | Age: 49
End: 2024-03-13
Payer: COMMERCIAL

## 2024-03-13 VITALS
OXYGEN SATURATION: 92 % | WEIGHT: 222.4 LBS | HEART RATE: 82 BPM | HEIGHT: 71 IN | TEMPERATURE: 97.8 F | BODY MASS INDEX: 31.14 KG/M2 | SYSTOLIC BLOOD PRESSURE: 122 MMHG | DIASTOLIC BLOOD PRESSURE: 60 MMHG

## 2024-03-13 DIAGNOSIS — M54.42 LEFT-SIDED LOW BACK PAIN WITH LEFT-SIDED SCIATICA, UNSPECIFIED CHRONICITY: ICD-10-CM

## 2024-03-13 DIAGNOSIS — R20.0 LEFT LEG NUMBNESS: Primary | ICD-10-CM

## 2024-03-13 DIAGNOSIS — R29.898 LEFT LEG WEAKNESS: ICD-10-CM

## 2024-03-13 PROCEDURE — 3074F SYST BP LT 130 MM HG: CPT | Performed by: STUDENT IN AN ORGANIZED HEALTH CARE EDUCATION/TRAINING PROGRAM

## 2024-03-13 PROCEDURE — 99214 OFFICE O/P EST MOD 30 MIN: CPT | Performed by: STUDENT IN AN ORGANIZED HEALTH CARE EDUCATION/TRAINING PROGRAM

## 2024-03-13 PROCEDURE — 3078F DIAST BP <80 MM HG: CPT | Performed by: STUDENT IN AN ORGANIZED HEALTH CARE EDUCATION/TRAINING PROGRAM

## 2024-03-13 RX ORDER — GABAPENTIN 300 MG/1
300 CAPSULE ORAL 3 TIMES DAILY
Qty: 90 CAPSULE | Refills: 0 | Status: SHIPPED | OUTPATIENT
Start: 2024-03-13

## 2024-03-13 RX ORDER — CYCLOBENZAPRINE HCL 5 MG
5 TABLET ORAL 3 TIMES DAILY PRN
Qty: 30 TABLET | Refills: 0 | Status: SHIPPED | OUTPATIENT
Start: 2024-03-13

## 2024-03-13 ASSESSMENT — PATIENT HEALTH QUESTIONNAIRE - PHQ9: CLINICAL INTERPRETATION OF PHQ2 SCORE: 0

## 2024-03-13 ASSESSMENT — FIBROSIS 4 INDEX: FIB4 SCORE: 0.89

## 2024-03-13 NOTE — PROGRESS NOTES
"Subjective:     CC:   Chief Complaint   Patient presents with    Back Pain     Lower back pain    Numbness     Left leg feels numb for about a week          HPI:   Donnie presents today   has history of chronic back pain in lumbar region which was better for a while   Lumbar X ray : Degenerative disc disease at the L4-5 and L5-S1 levels. ( 2015)  Reports that 1 week back patient was in gym doing exercise which includes running on the treadmill and lifting weights when suddenly he started having acute onset of pain in his low back with shooting pain and numbness in his upper thigh eventually numbness extended to his lower left extremity and severe pain below his left knee 9/10 intensity.  Patient reports he has been limping and having difficulty walking due to pain.  Denies any bowel incontinence, bladder incontinence. Some weakness in left leg due to numbness and pain near the knee    Health Maintenance: Completed    ROS:  ROS    Review of systems unremarkable except for concerns noted by patient or items listed.    Please see HPI for additional ROS.      Objective:     Exam:  /60 (BP Location: Left arm, Patient Position: Sitting)   Pulse 82   Temp 36.6 °C (97.8 °F) (Temporal)   Ht 1.803 m (5' 11\")   Wt 101 kg (222 lb 6.4 oz)   SpO2 92%   BMI 31.02 kg/m²  Body mass index is 31.02 kg/m².    Physical Exam  Constitutional:       General: He is not in acute distress.     Appearance: Normal appearance.   HENT:      Head: Normocephalic.   Cardiovascular:      Rate and Rhythm: Normal rate and regular rhythm.      Pulses: Normal pulses.      Heart sounds: Normal heart sounds.   Pulmonary:      Effort: Pulmonary effort is normal.      Breath sounds: Normal breath sounds.   Musculoskeletal:         General: Tenderness present.      Right lower leg: No edema.      Left lower leg: No edema.   Skin:     General: Skin is warm.   Neurological:      Mental Status: He is alert and oriented to person, place, and time.     "  Motor: Weakness present.      Gait: Gait abnormal.      Comments: 4/5 strength in left lower leg   5/5 strength right lower leg   Mild sensory deficit in left lower leg    Psychiatric:         Mood and Affect: Mood normal.         Behavior: Behavior normal.             Labs: reviewed     Assessment & Plan:     48 y.o. male with the following -     1. Left leg numbness  2. Left leg weakness  Acute problem   Has history of chronic low back pain but this is acute worsening of symptoms  Plan  - gabapentin (NEURONTIN) 300 MG Cap; Take 1 Capsule by mouth 3 times a day.  Dispense: 90 Capsule; Refill: 0  - DME Crutches  - Referral to Physical Therapy  - Referral to Pain Clinic  - MR-LUMBAR SPINE-W/O; Future    2. Left-sided low back pain with left-sided sciatica, unspecified chronicity  Chronic, with acute worsening of symptoms  No acute red flag signs but given acute worsening of pain I would consider an MRI of lumbar spine and specialist follow-up  Given his symptoms are worsening I will refer him to specialist  - gabapentin (NEURONTIN) 300 MG Cap; Take 1 Capsule by mouth 3 times a day.  Dispense: 90 Capsule; Refill: 0  - DME Crutches  - Referral to Physical Therapy  - Referral to Pain Clinic  - MR-LUMBAR SPINE-W/O; Future      I spent a total of 30 minutes with record review, exam, communication with the patient, communication with other providers, and documentation of this encounter  I spent at least 50% of the time for counseling and education.         Return in about 4 weeks (around 4/10/2024) for imaging .    Please note that this dictation was created using voice recognition software. I have made every reasonable attempt to correct obvious errors, but I expect that there are errors of grammar and possibly content that I did not discover before finalizing the note.

## 2024-03-19 ENCOUNTER — HOSPITAL ENCOUNTER (OUTPATIENT)
Dept: RADIOLOGY | Facility: MEDICAL CENTER | Age: 49
End: 2024-03-19
Attending: STUDENT IN AN ORGANIZED HEALTH CARE EDUCATION/TRAINING PROGRAM
Payer: COMMERCIAL

## 2024-03-19 DIAGNOSIS — R20.0 LEFT LEG NUMBNESS: ICD-10-CM

## 2024-03-19 DIAGNOSIS — M54.42 LEFT-SIDED LOW BACK PAIN WITH LEFT-SIDED SCIATICA, UNSPECIFIED CHRONICITY: ICD-10-CM

## 2024-03-19 PROCEDURE — 72148 MRI LUMBAR SPINE W/O DYE: CPT

## 2024-03-21 ENCOUNTER — OFFICE VISIT (OUTPATIENT)
Dept: PHYSICAL MEDICINE AND REHAB | Facility: MEDICAL CENTER | Age: 49
End: 2024-03-21
Payer: COMMERCIAL

## 2024-03-21 VITALS
SYSTOLIC BLOOD PRESSURE: 162 MMHG | TEMPERATURE: 97.7 F | HEIGHT: 71 IN | DIASTOLIC BLOOD PRESSURE: 102 MMHG | BODY MASS INDEX: 30.38 KG/M2 | OXYGEN SATURATION: 96 % | WEIGHT: 217 LBS | HEART RATE: 60 BPM

## 2024-03-21 DIAGNOSIS — Z71.3 DIETARY COUNSELING: ICD-10-CM

## 2024-03-21 DIAGNOSIS — Z71.82 EXERCISE COUNSELING: ICD-10-CM

## 2024-03-21 DIAGNOSIS — M62.9 HAMSTRING TIGHTNESS OF BOTH LOWER EXTREMITIES: ICD-10-CM

## 2024-03-21 DIAGNOSIS — M54.16 LUMBAR RADICULOPATHY: ICD-10-CM

## 2024-03-21 DIAGNOSIS — M47.816 FACET ARTHRITIS OF LUMBAR REGION: ICD-10-CM

## 2024-03-21 DIAGNOSIS — M48.00 CENTRAL STENOSIS OF SPINAL CANAL: ICD-10-CM

## 2024-03-21 DIAGNOSIS — M47.9 SPONDYLOSIS: ICD-10-CM

## 2024-03-21 DIAGNOSIS — E66.9 OBESITY (BMI 30-39.9): ICD-10-CM

## 2024-03-21 DIAGNOSIS — R03.0 ELEVATED BLOOD PRESSURE READING: ICD-10-CM

## 2024-03-21 PROCEDURE — 1125F AMNT PAIN NOTED PAIN PRSNT: CPT | Performed by: GENERAL PRACTICE

## 2024-03-21 PROCEDURE — 3080F DIAST BP >= 90 MM HG: CPT | Performed by: GENERAL PRACTICE

## 2024-03-21 PROCEDURE — 99204 OFFICE O/P NEW MOD 45 MIN: CPT | Performed by: GENERAL PRACTICE

## 2024-03-21 PROCEDURE — 3077F SYST BP >= 140 MM HG: CPT | Performed by: GENERAL PRACTICE

## 2024-03-21 ASSESSMENT — PATIENT HEALTH QUESTIONNAIRE - PHQ9
CLINICAL INTERPRETATION OF PHQ2 SCORE: 1
5. POOR APPETITE OR OVEREATING: 0 - NOT AT ALL
SUM OF ALL RESPONSES TO PHQ QUESTIONS 1-9: 4

## 2024-03-21 ASSESSMENT — PAIN SCALES - GENERAL: PAINLEVEL: 8=MODERATE-SEVERE PAIN

## 2024-03-21 ASSESSMENT — FIBROSIS 4 INDEX: FIB4 SCORE: 0.89

## 2024-03-21 NOTE — PATIENT INSTRUCTIONS
"Gabapentin: A possible side effect are dizziness, drowsiness, respiratory depression, hypotension, agitation emotional liability. while your body is adjusting to the medicine. If you experience any unwanted side effects, decrease the gabapentin to the previous dose that you did not have side effects on. Gabapentin is a relatively slow acting medicine so you may not feel immediate relief.  For chronic use, an adequate trial with gabapentin may require 2 months or more.  increase dose weekly based on response and tolerability to a target dose range of 300 mg to 1.2 g 3 times daily    Diet  \"-Emphasizes fruits, vegetables, whole grains, and fat-free or low-fat milk and milk products  -Includes a variety of protein foods such as seafood, lean meats and poultry, eggs, legumes (beans and peas), soy products, nuts, and seeds.  -Is low in added sugars, sodium, saturated fats, trans fats, and cholesterol.  -Stays within your daily calorie needs\"  https://www.cdc.gov/healthyweight/healthy_eating/index.html    Exercise  \"We know 150 minutes of physical activity each week sounds like a lot, but you don’t have to do it all at once. It could be 30 minutes a day, 5 days a week. You can spread your activity out during the week and break it up into smaller chunks of time.\"  https://www.cdc.gov/physicalactivity/basics/adults/index.htm  \"Exercise, multidisciplinary rehabilitation, acupuncture, CBT, and mind-body practices were most consistently associated with durable slight to moderate improvements in function and pain for specific chronic pain conditions. \"  https://effectivehealthcare.ahrq.gov/products/nonpharma-treatment-pain/research-2018    "

## 2024-03-21 NOTE — PROGRESS NOTES
Physiatry (Physical Medicine and  Rehabilitation)       Patient Name: Donnie Messina   Patient : 1975  Patient Age: 48 y.o.   PCP: Tahmina Levy M.D.  MRN: [id number]     Date of service: See epic    Chief complaint:   Chief Complaint   Patient presents with    Establish Care     Left Leg Numbness, Left Low Back Pain With Sciatica        Referring provider: Tahmina Levy M.D.    HISTORY    Donnie Messina is a 48 y.o. year old very pleasant male with past medical history of hypertension, former tobacco use, prior back pain with herniated disc, and BMI of 30 who presents with a referral for leg numbness and left-sided low back pain with left-sided sciatica.      Medical records review:  I reviewed the note from the referring provider Tahmina Levy M.D. including the note dated left  3/13/24    Prior Procedures:   11/13/15 Dr Dale steroid shot but no documentation  Prior EMG in  or     HPI:    3/21/2024 acute on chronic low back pain.  3 weeks. Pain level currently 8/10 but started at 10/10.  Nonwork related injury.  Cook.  Currently using medications.  Associated weakness with left lower extremity.  Difficulty with squatting.  Works as a cook.  In addition, has noted atrophy of calf.   Currently prescribed Flexeril 5 mg 3 times daily and gabapentin 300 mg 3 times a day.  Last back injury .     Elevated blood pressure: Stop taking blood pressure medications due to making him feel sick.     ROS:   Red Flags ROS:   Fever, Chills, Sweats: Denies  Involuntary Weight Loss: Denies  Bladder Incontinence: Denies  Bowel Incontinence: denies  Loss of genital sensation: Denies  Falls: denies  progressive motor weakness: denies  All other systems reviewed and negative.         GOALS OF TREATMENT: Symptom/Pain relief. improve function.        PMHx:   Past Medical History:   Diagnosis Date    Folliculitis 2018       PSHx:   History reviewed. No pertinent surgical  history.    Family history   Family History   Problem Relation Age of Onset    No Known Problems Mother     No Known Problems Sister     No Known Problems Brother     No Known Problems Son     No Known Problems Son     No Known Problems Son     Cancer Neg Hx     Diabetes Neg Hx     Heart Disease Neg Hx        Medications: reviewed on epic.   Outpatient Medications Marked as Taking for the 3/21/24 encounter (Office Visit) with Jose Roberto Somers D.O.   Medication Sig Dispense Refill    gabapentin (NEURONTIN) 300 MG Cap Take 1 Capsule by mouth 3 times a day. 90 Capsule 0    cyclobenzaprine (FLEXERIL) 5 mg tablet Take 1 Tablet by mouth 3 times a day as needed for Muscle Spasms or Moderate Pain. 30 Tablet 0    atorvastatin (LIPITOR) 10 MG Tab Take 1 Tablet by mouth every evening. 30 Tablet 2    losartan (COZAAR) 50 MG Tab Take 1 Tablet by mouth every day. 30 Tablet 2    tamsulosin (FLOMAX) 0.4 MG capsule Take 1 Capsule by mouth 1/2 hour after breakfast. 30 Capsule 2    omeprazole (PRILOSEC) 20 MG delayed-release capsule Take 1 Capsule by mouth 1 time a day as needed (heartburn). 30 Capsule 0    polyvinyl alcohol-povidone (REFRESH) 1.4-0.6 % ophthalmic solution Administer 1 Drop into the left eye as needed (dry eye). 30 mL 0        Allergies:   Allergies   Allergen Reactions    Lisinopril Cough     Dry cough        Social Hx:   Social History     Socioeconomic History    Marital status: Single     Spouse name: Not on file    Number of children: Not on file    Years of education: Not on file    Highest education level: Not on file   Occupational History    Not on file   Tobacco Use    Smoking status: Former     Current packs/day: 0.00     Average packs/day: 0.3 packs/day for 8.0 years (2.0 ttl pk-yrs)     Types: Cigarettes     Start date: 1989     Quit date: 1997     Years since quittin.2    Smokeless tobacco: Never   Vaping Use    Vaping Use: Never used   Substance and Sexual Activity    Alcohol use: No      "Comment: occ    Drug use: No    Sexual activity: Yes     Partners: Female     Comment: In relationship. 3 children.    Other Topics Concern    Not on file   Social History Narrative    ** Merged History Encounter **          Social Determinants of Health     Financial Resource Strain: Not on file   Food Insecurity: Not on file   Transportation Needs: Not on file   Physical Activity: Not on file   Stress: Not on file   Social Connections: Not on file   Intimate Partner Violence: Not on file   Housing Stability: Not on file         EXAMINATION   Vitals: BP (!) 162/102 (BP Location: Left arm, Patient Position: Sitting, BP Cuff Size: Adult)   Pulse 60   Temp 36.5 °C (97.7 °F) (Temporal)   Ht 1.803 m (5' 11\")   Wt 98.4 kg (217 lb)   SpO2 96%   Physical Exam:     Body Habitus: Body mass index is 30.27 kg/m².  Appearance: Well-groomed, well-nourished, not disheveled  Eyes: No scleral icterus to suggest severe liver disease, no proptosis to suggest severe hyperthyroid  ENT -no obvious auditory deficits, no external lesions, moist mucus membranes   Skin -no rashes or lesions noted. No appreciable skin breakdown on exposed skin areas.    Respiratory-  breathing comfortably on room air, no audible wheezing, full sentences  Cardiovascular- No lower extremity edema noted.   Psychiatric- alert and oriented, calm, comfortable, cooperative     Musculoskeletal and Neuro:  Gait and station - abnormal gait with reciprocal pattern, but for left hip flexion with femoral retroversion no presence/use of ambulatory device, no arm assistance with sit-to-stand, nonantalgic. no loss of balance during exam.  No change in patient's demeanor with exam.    Grossly normal cranial nerve exam  Coordination grossly intact  Single leg balance / Trendelenburg:  moderately limited in balance and control positive bilaterally    Thoracic/Lumbar Spine/Sacral Spine/Hips   Inspection: No evidence of atrophy in bilateral lower extremities throughout "     ROM: full  active range of motion with flexion, lateral flexion, and rotation bilaterally.   There is full  active range of motion with lumbar extension with pain.    There is pain with facet loading maneuver (extension rotation) with axial low back pain on the BILATERAL side(s) but pain isolated to left lower back    Palpation:   POSITIVE for tenderness to palpation to the para-midline region in the lower lumbar levels.  palpation over SI joint: negative bilaterally  palpation in hip or over the gluteus medius tendon insertion: negative right, positive left     Lumbar spine Special tests  Neuro tension  Straight leg test} negative bilaterally  Slump test} negative bilaterally    HIP  FAIR test negative bilaterally   Range of motion in the RIGHT hip is full  in flexion, extension, abduction, internal rotation, external rotation.  Range of motion in the LEFT hip is full  in flexion, extension, abduction, internal rotation, external rotation.  Chrystal negative bilaterally  Hamstring tightness with forward flexion  Finger-to-Floor Distance in maximal forward flexion >8inches      SI joint tests  Observation patient sits on one buttocks: Negative  SI joint compression negative bilaterally    SI joint distraction negative bilaterally  Thigh thrust test negative bilaterally   CHRYSTAL test negative bilaterally    Key points for the international standards for neurological classification of spinal cord injury (ISNCSCI) to light touch.   Dermatome R L   L2 2 1   L3 2 1   L4 2 1   L5 2 1   S1 2 1     Motor Exam Lower Extremities  ? Myotome R L   Hip flexion L2 5 4+   Knee extension L3 5 5   Ankle dorsiflexion L4 5 2-3   Toe extension L5 5 2-3   Ankle plantarflexion S1 5 4   Hip Abduction  4+ 4+   Hip Adduction  5 5     Reflexes  Clonus of the ankle negative bilaterally   ? R L   Patella 2+ 1+   Achilles  2+ 1+   Babinski sign negative bilaterally         MEDICAL DECISION MAKING    Medical records review: see under HPI  "section.     DATA    Labs:   Lab Results   Component Value Date/Time    SODIUM 136 08/31/2023 08:52 AM    POTASSIUM 4.7 08/31/2023 08:52 AM    CHLORIDE 102 08/31/2023 08:52 AM    CO2 24 08/31/2023 08:52 AM    ANION 10.0 08/31/2023 08:52 AM    GLUCOSE 90 08/31/2023 08:52 AM    BUN 25 (H) 08/31/2023 08:52 AM    CREATININE 1.01 08/31/2023 08:52 AM    CREATININE 1.0 03/12/2008 11:13 AM    CALCIUM 10.0 08/31/2023 08:52 AM    ASTSGOT 32 08/31/2023 08:52 AM    ALTSGPT 42 08/31/2023 08:52 AM    TBILIRUBIN 0.3 08/31/2023 08:52 AM    ALBUMIN 4.5 08/31/2023 08:52 AM    TOTPROTEIN 7.6 08/31/2023 08:52 AM    GLOBULIN 3.1 08/31/2023 08:52 AM    AGRATIO 1.5 08/31/2023 08:52 AM   ]    No results found for: \"PROTHROMBTM\", \"INR\"     Lab Results   Component Value Date/Time    WBC 6.1 08/31/2023 08:52 AM    RBC 5.23 08/31/2023 08:52 AM    HEMOGLOBIN 14.7 08/31/2023 08:52 AM    HEMATOCRIT 47.0 08/31/2023 08:52 AM    MCV 89.9 08/31/2023 08:52 AM    MCH 28.1 08/31/2023 08:52 AM    MCHC 31.3 (L) 08/31/2023 08:52 AM    MPV 9.6 08/31/2023 08:52 AM    NEUTSPOLYS 61.40 09/24/2018 10:46 AM    LYMPHOCYTES 26.80 09/24/2018 10:46 AM    MONOCYTES 5.50 09/24/2018 10:46 AM    EOSINOPHILS 5.80 09/24/2018 10:46 AM    BASOPHILS 0.30 09/24/2018 10:46 AM        Lab Results   Component Value Date/Time    HBA1C 5.3 08/31/2023 08:52 AM        Imaging:   I personally reviewed following images, these are my reads  Lumbar flexion and extension x-ray 10/15/2015: Degenerative changes, mild levoscoliosis, possibly mild facet arthritis  Lumbar MRI 3/14/2024: Degenerative changes, disc bulges present in lower 3 levels with spinal canal stenosis, L3 noted 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     IMAGING radiology reads. I reviewed the following radiology reads                      Results for orders placed during the hospital encounter of 03/19/24    MR-LUMBAR SPINE-W/O    Impression  1.  Interval appearance of an extradural mass posterior to the L3 vertebral body to the left of midline. This markedly attenuates the left lateral recess in that area and extends along the course of the left L3 nerve root with some extension into the  left L3 neuroforamen. This measures 13 x 10 x 6 mm in size and does parallel the signal of disc material. This may represent a free disc fragment versus other extraluminal mass such as meningioma or nerve sheath tumor. Contrast-enhanced sequences are  recommended for further evaluation.    2.  Again seen degenerative disc disease at L3-4, L4-5 and L5-S1 which results in mild-to-moderate central canal narrowing at L4-5.    3.  Multilevel attenuation of the lateral recesses.    4.  Neural foraminal narrowing at levels as specifically described above.        Results for orders placed during the hospital encounter of 03/19/24    MR-LUMBAR SPINE-W/O    Impression  1.  Interval appearance of an extradural mass posterior to the L3 vertebral body to the left of midline. This markedly attenuates the left lateral recess in that area and extends along the course of the left L3 nerve root with some extension into the  left L3 neuroforamen. This measures 13 x 10 x 6 mm in size and does parallel the signal of disc material. This may represent a free disc fragment versus other extraluminal mass such as meningioma or nerve sheath tumor. Contrast-enhanced sequences are  recommended for further evaluation.    2.  Again seen degenerative disc disease at L3-4, L4-5 and L5-S1 which results in mild-to-moderate central canal narrowing at L4-5.    3.  Multilevel attenuation of the lateral recesses.    4.  Neural foraminal narrowing at  levels as specifically described above.                                                               Results for orders placed during the hospital encounter of 10/15/15    DX-LUMBAR SPINE-4+ VIEWS    Impression  Degenerative disc disease at the L4-5 and L5-S1 levels.                        Diagnosis   Visit Diagnoses     ICD-10-CM   1. Lumbar radiculopathy  M54.16   2. Spondylosis  M47.9   3. Facet arthritis of lumbar region  M47.816   4. Hamstring tightness of both lower extremities  M62.9   5. Central stenosis of spinal canal  M48.00   6. Obesity (BMI 30-39.9)  E66.9   7. Elevated blood pressure reading  R03.0   8. Dietary counseling  Z71.3   9. Exercise counseling  Z71.82       ASSESSMENT AND PLAN:  Donnie Messina 48 y.o. male  Patient with historical and clinical evidence consistent with lumbar radiculopathy supported by MRI, weakness and manual motor testing especially with dorsiflexion, hyperreflexia, decreased sensory.  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  In addition, patient has central canal stenosis but no hyperreflexia or incontinence or saddle anesthesia.  Will refer patient to orthopedic oncology for evaluation for neurosurgery.  Will schedule for EMG.  Continue previously scheduled medications.   Noted facet arthritis and degenerative changes: Will continue to monitor  Elevated blood pressure: Currently 162/100; no cardiac symptoms.  Patient discontinued use of losartan message PCP  Recommend vitamin D screening; Hypovitaminosis D can contribute to diffuse musculoskeletal aches, fatigue, and malaise and can affect exercise and therapy  Dietary and exercising counseling discussed.  Extensive discussion regarding treatment options, at this time recommending the following:    Orders Placed This Encounter    Referral to Pain Clinic    Referral to Orthopedic Oncology         PLAN  I did have an extensive discussion regarding pathology and treatment options with the  patient today including medications, injections, physical agents (eg. water, heat, cold, TENS), therapy-based programs (eg. massage, stretching/traction, exercise), alternative modalities (eg. Acupuncture, OMT, chiropractor, etc), and lastly surgical intervention:  -Medications/Modalities:   Previously prescribed medications  -Limitations:    Activity as tolerated and No heavy lifting, bending or twisting  -Brace/orthotic/assistive devices: Not indicated at this time  -Therapy (PT/OT/Aquatherapy): will order at next visit to focus on strengthening and stretching and may include physical agents (eg. water, heat, cold, TENS) and/or therapy-based programs (eg. massage, stretching/traction, exercise),  -Home exercise program: encouraged and demonstrated home exercises of regular strengthening and stretching, such as single leg balance exercises  -Office Injections: not indicated at this time   -Diagnostic workup: reviewed today as above; EMG  -Interventional program: I may consider the patient for injection depending on the results of the above   -Referrals: EMG, orthopedic oncology  -Outside records requested:  The patient signed Outside Records Request Form for his outside records including images. This includes the records from [facility]     Follow-up: EMG, or sooner as needed for any acute issues.  Patient expressed understanding of the management plan. Patient (and Family Members) was/were encouraged to call if any worries, issues, problems or concerns prior to the next visit     Please note that this dictation was created using voice recognition software. I have made every reasonable attempt to correct obvious errors but there may be errors of grammar and content that I may have overlooked prior to finalization of this note.      Jose Roberto Somers, DO  Physical Medicine and Rehabilitation  [facility]         EFRAIN Levy M.D.   Tahmina Olivas M.D.

## 2024-03-21 NOTE — Clinical Note
Sania,  Would you be interested in seeing this patient who is an extradural mass measured at 10 mm x 6 mm x 9 mm affecting his L3 nerve root seen on MRI?  His prior MRIs did not reveal any masses.  He did present with radicular symptoms and atrophy and dorsiflexion weakness.  Thanks   Jose Roberto

## 2024-03-21 NOTE — Clinical Note
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 In addition, patient had elevated blood pressure of 162/102 and reported that he discontinue the use of losartan due to adverse effects.  I instructed the patient to follow-up with you to discuss other options.  Thank you for the referral.  Please contact if further questions.     Best Regards,   Jose Roberto Somers, DO   Physical Medicine and Rehabilitation

## 2024-03-23 DIAGNOSIS — R20.0 LEFT LEG NUMBNESS: ICD-10-CM

## 2024-03-23 DIAGNOSIS — M54.42 LEFT-SIDED LOW BACK PAIN WITH LEFT-SIDED SCIATICA, UNSPECIFIED CHRONICITY: ICD-10-CM

## 2024-03-23 DIAGNOSIS — R93.7 ABNORMAL MRI, LUMBAR SPINE: ICD-10-CM

## 2024-03-24 NOTE — RESULT ENCOUNTER NOTE
Patient has abnormal finding of MRI of his spine  I am ordering further testing and referral to specialist .  Patient does not have mychart .   I want him to make a urgent appointment with me to discuss this further in detail. ( in person or zoom) , ok to double book if needed with a N2U 40 min visit .    Thank you,    Dr. Cam MD

## 2024-03-25 DIAGNOSIS — M54.16 LUMBAR RADICULOPATHY: ICD-10-CM

## 2024-04-01 ENCOUNTER — APPOINTMENT (OUTPATIENT)
Dept: PHYSICAL MEDICINE AND REHAB | Facility: MEDICAL CENTER | Age: 49
End: 2024-04-01
Payer: COMMERCIAL

## 2024-04-01 VITALS
TEMPERATURE: 98 F | BODY MASS INDEX: 30.35 KG/M2 | HEART RATE: 82 BPM | WEIGHT: 216.8 LBS | SYSTOLIC BLOOD PRESSURE: 166 MMHG | HEIGHT: 71 IN | OXYGEN SATURATION: 97 % | DIASTOLIC BLOOD PRESSURE: 86 MMHG

## 2024-04-01 DIAGNOSIS — R03.0 ELEVATED BLOOD PRESSURE READING: ICD-10-CM

## 2024-04-01 DIAGNOSIS — M48.00 CENTRAL STENOSIS OF SPINAL CANAL: ICD-10-CM

## 2024-04-01 DIAGNOSIS — M54.16 LUMBAR RADICULOPATHY: ICD-10-CM

## 2024-04-01 PROCEDURE — 99212 OFFICE O/P EST SF 10 MIN: CPT | Mod: 25 | Performed by: GENERAL PRACTICE

## 2024-04-01 PROCEDURE — 95886 MUSC TEST DONE W/N TEST COMP: CPT | Performed by: GENERAL PRACTICE

## 2024-04-01 PROCEDURE — 3077F SYST BP >= 140 MM HG: CPT | Performed by: GENERAL PRACTICE

## 2024-04-01 PROCEDURE — 1125F AMNT PAIN NOTED PAIN PRSNT: CPT | Performed by: GENERAL PRACTICE

## 2024-04-01 PROCEDURE — 3079F DIAST BP 80-89 MM HG: CPT | Performed by: GENERAL PRACTICE

## 2024-04-01 PROCEDURE — 95909 NRV CNDJ TST 5-6 STUDIES: CPT | Performed by: GENERAL PRACTICE

## 2024-04-01 ASSESSMENT — FIBROSIS 4 INDEX: FIB4 SCORE: 0.89

## 2024-04-01 ASSESSMENT — PAIN SCALES - GENERAL: PAINLEVEL: 4=SLIGHT-MODERATE PAIN

## 2024-04-01 NOTE — PROGRESS NOTES
"Physiatry (Physical Medicine and  Rehabilitation)       Patient Name: Donnie Messina   Patient : 1975  Patient Age: 48 y.o.   PCP: Tahmina Levy M.D.  MRN: [id number]     Date of service: See epic      This is a separate note from the procedure.     Chief complaint: elevated BP    HISTORY    Donnie Messina is a 48 y.o. year old very pleasant male who presented for a procedure but also has the complaint of elevated BP    HPI:    No cardiac symptoms. Scheduled 24 with PCP      ROS:   Red Flags ROS:   Fever, Chills, Sweats: Denies  Involuntary Weight Loss: Denies  Bladder Incontinence: Denies  Bowel Incontinence: denies  Loss of genital sensation: Denies  Falls: denies  progressive motor weakness: denies  All other systems reviewed and negative.      PMHx:   Past Medical History:   Diagnosis Date    Folliculitis 2018       Reviewed Social history and Surgical History    Medications: reviewed on [facility].   No outpatient medications have been marked as taking for the 24 encounter (Office Visit) with Jose Roberto Somers D.O..        Allergies:   Allergies   Allergen Reactions    Lisinopril Cough     Dry cough          EXAMINATION   Vitals: BP (!) 166/86 (BP Location: Right arm, Patient Position: Sitting, BP Cuff Size: Large adult)   Pulse 82   Temp 36.7 °C (98 °F) (Temporal)   Ht 1.803 m (5' 11\")   Wt 98.3 kg (216 lb 12.8 oz)   SpO2 97%   Physical Exam:     Body Habitus: Body mass index is 30.24 kg/m².  Appearance: Well-groomed, well-nourished, not disheveled  Eyes: No scleral icterus to suggest severe liver disease, no proptosis to suggest severe hyperthyroid  ENT -no obvious auditory deficits, no external lesions, moist mucus membranes   Skin -no rashes or lesions noted. No appreciable skin breakdown on exposed skin areas.    Respiratory-  breathing comfortably on room air, no audible wheezing, full sentences  Cardiovascular- No lower extremity edema noted.   Psychiatric- " alert and oriented, calm, comfortable, cooperative       MEDICAL DECISION MAKING    Medical records review: see under HPI section.     DATA    Labs:   Reviewed    Imaging:   None available      Diagnosis   Visit Diagnoses     ICD-10-CM   1. Lumbar radiculopathy  M54.16   2. Elevated blood pressure reading  R03.0       ASSESSMENT AND PLAN:  Donnie Webber Siddharth 48 y.o. male  Patient with historical and clinical evidence consistent with Elevated blood pressure: Follow-up with Dr. Levy 4/8/2024. Encouraged to follow up with PCP.  Extensive discussion regarding treatment options, at this time recommending the following:    No orders of the defined types were placed in this encounter.    Follow-up:  PRN or sooner as needed for any acute issues.  Patient expressed understanding of the management plan. Patient (and Family Members) was/were encouraged to call if any worries, issues, problems or concerns prior to the next visit     Please note that this dictation was created using voice recognition software. I have made every reasonable attempt to correct obvious errors but there may be errors of grammar and content that I may have overlooked prior to finalization of this note.      Jose Roberto Somers, DO  Physical Medicine and Rehabilitation  [facility]         EFRAIN Levy M.D.   Jose Roberto Greene D.O.

## 2024-04-01 NOTE — PROCEDURES
"Electromyography Report          Name: Donnie Messina    MRN: [id number]   YOB: 1975 (48 y.o.)   Sex: male   Height:   Vitals:    04/01/24 1423   BP: (!) 166/86   Weight: 98.3 kg (216 lb 12.8 oz)   Height: 1.803 m (5' 11\")       Examining Physician: Jose Roberto Somers D.O.     EMG Examination Date: 4/1/2024     Impression:      This is an abnormal study.   Needle EMG findings are suggestinve of a L2 and/or L3 and/or L4 radiculopathy of the left lower extremity  The electrophysiologic findings are consistent with an acute common left peroneal mononeuropathy due to compression at the fibular head     Recommendations: Follow up appointment with spine surgeon.  Advise to stop crossing legs.      History: Crosses legs    Physical exam: Weakness with left hip flexion, left adductors, left knee extension, left toe extension and left dorsiflexion compared to the right.  Atrophy of left thigh    Nerve Conduction Studies and Electromyography  Examination Findings:      Nerve Conduction Studies Examination Findings:      LEFT sural sensory is abnormal: normal  nerve action potential (SNAP) amplitude. delayed peak latency. Normal conduction velocity   RIGHT sural sensory nerve action potential (SNAP) amplitude, peak latency and conduction velocity are slightly normal.   LEFT superficial peroneal sensory nerve action potential (SNAP) amplitude, peak latency and conduction velocity are normal.     LEFT  peroneal compound muscle action potential (CMAP) amplitude, distal latency, conduction velocity  are normal. Decrease amplitude below fibular head and popliteal fossa revealing conduction block.   RIGHT peroneal compound muscle action potential (CMAP) amplitude, distal latency, conduction velocity are normal.   LEFT  tibial compound muscle action potential (CMAP) amplitude, distal latency, conduction velocity are normal.     Needle EMG of muscles of LEFT lower extremity is abnormal: fibrillations present " with anterior tibialis & vastus medialis.  Reduced recruitment of the anterior tibilis and adductor longus.  Positive sharp waves present with adductor longus.     Unless otherwise noted, the temperature of the patient was monitored continuously and remained between 32 and 36 degrees centigrade during the performance of the nerve conduction studies.    Reference values are from the [facility] normative data guidelines and Crystal related to age guidelines.   The study was done with a concentric needle examination.   Please see the attached document for raw data or the scanned document in EPIC.        cpt 81130. Nerve conduction studies,  5-6 studies  CPT 74389. needle electromyography, complete, each extremity.       Jose Roberto Somers D.O.

## 2024-04-08 ENCOUNTER — OFFICE VISIT (OUTPATIENT)
Dept: MEDICAL GROUP | Facility: MEDICAL CENTER | Age: 49
End: 2024-04-08
Payer: COMMERCIAL

## 2024-04-08 VITALS
DIASTOLIC BLOOD PRESSURE: 80 MMHG | BODY MASS INDEX: 30.62 KG/M2 | OXYGEN SATURATION: 93 % | WEIGHT: 218.7 LBS | HEIGHT: 71 IN | TEMPERATURE: 98.4 F | SYSTOLIC BLOOD PRESSURE: 132 MMHG | HEART RATE: 83 BPM

## 2024-04-08 DIAGNOSIS — M54.16 LUMBAR RADICULOPATHY: Primary | ICD-10-CM

## 2024-04-08 DIAGNOSIS — R93.7 ABNORMAL MRI, LUMBAR SPINE: ICD-10-CM

## 2024-04-08 DIAGNOSIS — I10 ESSENTIAL HYPERTENSION: ICD-10-CM

## 2024-04-08 DIAGNOSIS — Z23 NEED FOR VACCINATION: ICD-10-CM

## 2024-04-08 DIAGNOSIS — M47.9 SPONDYLOSIS: ICD-10-CM

## 2024-04-08 PROCEDURE — 99214 OFFICE O/P EST MOD 30 MIN: CPT | Mod: 25 | Performed by: STUDENT IN AN ORGANIZED HEALTH CARE EDUCATION/TRAINING PROGRAM

## 2024-04-08 PROCEDURE — 90746 HEPB VACCINE 3 DOSE ADULT IM: CPT | Performed by: STUDENT IN AN ORGANIZED HEALTH CARE EDUCATION/TRAINING PROGRAM

## 2024-04-08 PROCEDURE — 3075F SYST BP GE 130 - 139MM HG: CPT | Performed by: STUDENT IN AN ORGANIZED HEALTH CARE EDUCATION/TRAINING PROGRAM

## 2024-04-08 PROCEDURE — 3079F DIAST BP 80-89 MM HG: CPT | Performed by: STUDENT IN AN ORGANIZED HEALTH CARE EDUCATION/TRAINING PROGRAM

## 2024-04-08 PROCEDURE — 90471 IMMUNIZATION ADMIN: CPT | Performed by: STUDENT IN AN ORGANIZED HEALTH CARE EDUCATION/TRAINING PROGRAM

## 2024-04-08 RX ORDER — AMLODIPINE BESYLATE 5 MG/1
5 TABLET ORAL DAILY
Qty: 30 TABLET | Refills: 2 | Status: SHIPPED | OUTPATIENT
Start: 2024-04-08

## 2024-04-08 RX ORDER — DULOXETIN HYDROCHLORIDE 30 MG/1
30 CAPSULE, DELAYED RELEASE ORAL DAILY
Qty: 30 CAPSULE | Refills: 2 | Status: SHIPPED | OUTPATIENT
Start: 2024-04-08

## 2024-04-08 ASSESSMENT — FIBROSIS 4 INDEX: FIB4 SCORE: 0.89

## 2024-04-08 NOTE — PROGRESS NOTES
"Subjective:     CC:   Chief Complaint   Patient presents with    Results     MRI Results    Medication Management     Wants to talk about blood pressure medication    Other     Also wants to discuss pain medication         HPI:   Donnie presents today with    Patient with history of essential hypertension.  He was off medications for a while and his blood pressures were in the 150s last visit.  Due to history of dry cough with lisinopril switched to losartan 50 mg once a day.  Patient reports he is having GI side effects including nausea and vomiting with losartan and has not tolerated the medication well.  Would like to switch or cut back on the dose.      Patient had acute low back pain for manage he had an MRI and referred to physiatry. MRI show  attenuation about 1.3 cm in size which is not clear if it is a disc fragment versus extraluminal mass.  He also has degenerative disc disease as well as moderate central canal narrowing at L4-L5.  MRI with and without contrast has been ordered and patient is referred to neurosurgery for further management.    Continues to have some left lower extremity numbness and mild weakness.  He is following up with physiatry Dr. Somers and is going to follow-up with neurosurgery.  MRI with contrast is scheduled for May 4.  Pain he has tried gabapentin which he could not tolerate due to side effects.  He is taking muscle relaxants which help but continues to have discomfort in the left lower leg.        Health Maintenance: Completed    ROS:  ROS    Review of systems unremarkable except for concerns noted by patient or items listed.    Please see HPI for additional ROS.      Objective:     Exam:  /80 (BP Location: Left arm, Patient Position: Sitting, BP Cuff Size: Adult)   Pulse 83   Temp 36.9 °C (98.4 °F) (Temporal)   Ht 1.803 m (5' 11\")   Wt 99.2 kg (218 lb 11.2 oz)   SpO2 93%   BMI 30.50 kg/m²  Body mass index is 30.5 kg/m².    Physical Exam  Constitutional:       " General: He is not in acute distress.     Appearance: Normal appearance.   HENT:      Head: Normocephalic.   Cardiovascular:      Rate and Rhythm: Normal rate and regular rhythm.      Pulses: Normal pulses.      Heart sounds: Normal heart sounds.   Pulmonary:      Effort: Pulmonary effort is normal.      Breath sounds: Normal breath sounds.   Skin:     General: Skin is warm.   Neurological:      Mental Status: He is alert and oriented to person, place, and time.   Psychiatric:         Mood and Affect: Mood normal.         Behavior: Behavior normal.             Labs: reviewed     Assessment & Plan:     48 y.o. male with the following -       1. Need for vaccination  - Hepatitis B Vaccine Adult 20+    2. Lumbar radiculopathy  3. Spondylosis   Acute on chronic   Patient did not tolerate gabapentin   Plan;  We discussed risk benefits with duloxetine.  Shared decision made to start with Cymbalta 30 mg once a day continue muscle relaxers and Tylenol as needed  Follow up with physiatry   - DULoxetine (CYMBALTA) 30 MG Cap DR Particles; Take 1 Capsule by mouth every day.  Dispense: 30 Capsule; Refill: 2    4. Essential hypertension  Chronic, stable   Patient did not tolerate losartan   Discussed risk and benefits of amlodipine   Shared decision made to switch medication  Plan:  - home BP check recommended and keep log  - return to clinic in 3 months (or sooner if home BP is persistently elevated above goal).   - alcohol cessation   - regular exercises   - avoid illicit drugs and tobacco  - DASH diet   - weight loss.     - amLODIPine (NORVASC) 5 MG Tab; Take 1 Tablet by mouth every day.  Dispense: 30 Tablet; Refill: 2    5. Abnormal MRI, lumbar spine  Finding of  attenuate about 1.3 cm in size at L3 level which is not clear if it is a disc fragment versus extraluminal mass  Plan:  MRI with and without contrast   Follow up with neurosurgery           Return in about 4 months (around 8/8/2024) for hypertension.    Please note  that this dictation was created using voice recognition software. I have made every reasonable attempt to correct obvious errors, but I expect that there are errors of grammar and possibly content that I did not discover before finalizing the note.

## 2024-05-04 ENCOUNTER — APPOINTMENT (OUTPATIENT)
Dept: RADIOLOGY | Facility: MEDICAL CENTER | Age: 49
End: 2024-05-04
Attending: GENERAL PRACTICE
Payer: COMMERCIAL

## 2024-05-09 ENCOUNTER — APPOINTMENT (OUTPATIENT)
Dept: ADMISSIONS | Facility: MEDICAL CENTER | Age: 49
End: 2024-05-09
Attending: STUDENT IN AN ORGANIZED HEALTH CARE EDUCATION/TRAINING PROGRAM
Payer: COMMERCIAL

## 2024-05-09 PROBLEM — M54.16 SPINAL STENOSIS OF LUMBAR REGION WITH RADICULOPATHY: Status: ACTIVE | Noted: 2024-04-12

## 2024-05-09 PROBLEM — M48.061 SPINAL STENOSIS OF LUMBAR REGION WITH RADICULOPATHY: Status: ACTIVE | Noted: 2024-04-12

## 2024-05-15 ENCOUNTER — PRE-ADMISSION TESTING (OUTPATIENT)
Dept: ADMISSIONS | Facility: MEDICAL CENTER | Age: 49
End: 2024-05-15
Attending: STUDENT IN AN ORGANIZED HEALTH CARE EDUCATION/TRAINING PROGRAM
Payer: COMMERCIAL

## 2024-05-22 ENCOUNTER — PRE-ADMISSION TESTING (OUTPATIENT)
Dept: ADMISSIONS | Facility: MEDICAL CENTER | Age: 49
End: 2024-05-22
Attending: STUDENT IN AN ORGANIZED HEALTH CARE EDUCATION/TRAINING PROGRAM
Payer: COMMERCIAL

## 2024-05-22 DIAGNOSIS — Z01.812 PRE-PROCEDURAL LABORATORY EXAMINATION: ICD-10-CM

## 2024-05-22 DIAGNOSIS — Z01.810 PREOPERATIVE CARDIOVASCULAR EXAMINATION: ICD-10-CM

## 2024-05-22 DIAGNOSIS — M51.16 LUMBAR DISC DISEASE WITH RADICULOPATHY: ICD-10-CM

## 2024-05-22 LAB
ANION GAP SERPL CALC-SCNC: 12 MMOL/L (ref 7–16)
BUN SERPL-MCNC: 20 MG/DL (ref 8–22)
CALCIUM SERPL-MCNC: 9.8 MG/DL (ref 8.5–10.5)
CHLORIDE SERPL-SCNC: 102 MMOL/L (ref 96–112)
CO2 SERPL-SCNC: 23 MMOL/L (ref 20–33)
CREAT SERPL-MCNC: 0.89 MG/DL (ref 0.5–1.4)
EKG IMPRESSION: NORMAL
ERYTHROCYTE [DISTWIDTH] IN BLOOD BY AUTOMATED COUNT: 42.3 FL (ref 35.9–50)
GFR SERPLBLD CREATININE-BSD FMLA CKD-EPI: 104 ML/MIN/1.73 M 2
GLUCOSE SERPL-MCNC: 82 MG/DL (ref 65–99)
HCT VFR BLD AUTO: 48.8 % (ref 42–52)
HGB BLD-MCNC: 16 G/DL (ref 14–18)
MCH RBC QN AUTO: 29.3 PG (ref 27–33)
MCHC RBC AUTO-ENTMCNC: 32.8 G/DL (ref 32.3–36.5)
MCV RBC AUTO: 89.2 FL (ref 81.4–97.8)
PLATELET # BLD AUTO: 275 K/UL (ref 164–446)
PMV BLD AUTO: 8.7 FL (ref 9–12.9)
POTASSIUM SERPL-SCNC: 4.1 MMOL/L (ref 3.6–5.5)
RBC # BLD AUTO: 5.47 M/UL (ref 4.7–6.1)
SCCMEC + MECA PNL NOSE NAA+PROBE: NEGATIVE
SCCMEC + MECA PNL NOSE NAA+PROBE: POSITIVE
SODIUM SERPL-SCNC: 137 MMOL/L (ref 135–145)
WBC # BLD AUTO: 5.8 K/UL (ref 4.8–10.8)

## 2024-05-22 PROCEDURE — 87641 MR-STAPH DNA AMP PROBE: CPT

## 2024-05-22 PROCEDURE — 36415 COLL VENOUS BLD VENIPUNCTURE: CPT

## 2024-05-22 PROCEDURE — 80048 BASIC METABOLIC PNL TOTAL CA: CPT

## 2024-05-22 PROCEDURE — 93010 ELECTROCARDIOGRAM REPORT: CPT | Performed by: INTERNAL MEDICINE

## 2024-05-22 PROCEDURE — 87640 STAPH A DNA AMP PROBE: CPT

## 2024-05-22 PROCEDURE — 93005 ELECTROCARDIOGRAM TRACING: CPT

## 2024-05-22 PROCEDURE — 85027 COMPLETE CBC AUTOMATED: CPT

## 2024-05-29 ENCOUNTER — APPOINTMENT (OUTPATIENT)
Dept: RADIOLOGY | Facility: MEDICAL CENTER | Age: 49
End: 2024-05-29
Attending: STUDENT IN AN ORGANIZED HEALTH CARE EDUCATION/TRAINING PROGRAM
Payer: COMMERCIAL

## 2024-05-29 ENCOUNTER — ANESTHESIA (OUTPATIENT)
Dept: SURGERY | Facility: MEDICAL CENTER | Age: 49
End: 2024-05-29
Payer: COMMERCIAL

## 2024-05-29 ENCOUNTER — ANESTHESIA EVENT (OUTPATIENT)
Dept: SURGERY | Facility: MEDICAL CENTER | Age: 49
End: 2024-05-29
Payer: COMMERCIAL

## 2024-05-29 ENCOUNTER — HOSPITAL ENCOUNTER (OUTPATIENT)
Facility: MEDICAL CENTER | Age: 49
End: 2024-05-29
Attending: STUDENT IN AN ORGANIZED HEALTH CARE EDUCATION/TRAINING PROGRAM | Admitting: STUDENT IN AN ORGANIZED HEALTH CARE EDUCATION/TRAINING PROGRAM
Payer: COMMERCIAL

## 2024-05-29 VITALS
SYSTOLIC BLOOD PRESSURE: 160 MMHG | HEIGHT: 71 IN | BODY MASS INDEX: 30.09 KG/M2 | HEART RATE: 65 BPM | RESPIRATION RATE: 14 BRPM | WEIGHT: 214.95 LBS | DIASTOLIC BLOOD PRESSURE: 87 MMHG | OXYGEN SATURATION: 94 % | TEMPERATURE: 97.5 F

## 2024-05-29 DIAGNOSIS — Z98.890 S/P LUMBAR MICRODISCECTOMY: ICD-10-CM

## 2024-05-29 PROCEDURE — 160048 HCHG OR STATISTICAL LEVEL 1-5: Performed by: STUDENT IN AN ORGANIZED HEALTH CARE EDUCATION/TRAINING PROGRAM

## 2024-05-29 PROCEDURE — 700111 HCHG RX REV CODE 636 W/ 250 OVERRIDE (IP): Performed by: ANESTHESIOLOGY

## 2024-05-29 PROCEDURE — 700111 HCHG RX REV CODE 636 W/ 250 OVERRIDE (IP): Mod: JZ | Performed by: STUDENT IN AN ORGANIZED HEALTH CARE EDUCATION/TRAINING PROGRAM

## 2024-05-29 PROCEDURE — 700102 HCHG RX REV CODE 250 W/ 637 OVERRIDE(OP): Performed by: ANESTHESIOLOGY

## 2024-05-29 PROCEDURE — 700101 HCHG RX REV CODE 250: Performed by: STUDENT IN AN ORGANIZED HEALTH CARE EDUCATION/TRAINING PROGRAM

## 2024-05-29 PROCEDURE — 160025 RECOVERY II MINUTES (STATS): Performed by: STUDENT IN AN ORGANIZED HEALTH CARE EDUCATION/TRAINING PROGRAM

## 2024-05-29 PROCEDURE — 72020 X-RAY EXAM OF SPINE 1 VIEW: CPT

## 2024-05-29 PROCEDURE — 700105 HCHG RX REV CODE 258: Performed by: ANESTHESIOLOGY

## 2024-05-29 PROCEDURE — 160029 HCHG SURGERY MINUTES - 1ST 30 MINS LEVEL 4: Performed by: STUDENT IN AN ORGANIZED HEALTH CARE EDUCATION/TRAINING PROGRAM

## 2024-05-29 PROCEDURE — 160041 HCHG SURGERY MINUTES - EA ADDL 1 MIN LEVEL 4: Performed by: STUDENT IN AN ORGANIZED HEALTH CARE EDUCATION/TRAINING PROGRAM

## 2024-05-29 PROCEDURE — 160009 HCHG ANES TIME/MIN: Performed by: STUDENT IN AN ORGANIZED HEALTH CARE EDUCATION/TRAINING PROGRAM

## 2024-05-29 PROCEDURE — 63030 LAMOT DCMPRN NRV RT 1 LMBR: CPT | Mod: LT | Performed by: STUDENT IN AN ORGANIZED HEALTH CARE EDUCATION/TRAINING PROGRAM

## 2024-05-29 PROCEDURE — 160036 HCHG PACU - EA ADDL 30 MINS PHASE I: Performed by: STUDENT IN AN ORGANIZED HEALTH CARE EDUCATION/TRAINING PROGRAM

## 2024-05-29 PROCEDURE — 63030 LAMOT DCMPRN NRV RT 1 LMBR: CPT | Mod: ASROC,LT

## 2024-05-29 PROCEDURE — 160035 HCHG PACU - 1ST 60 MINS PHASE I: Performed by: STUDENT IN AN ORGANIZED HEALTH CARE EDUCATION/TRAINING PROGRAM

## 2024-05-29 PROCEDURE — 160002 HCHG RECOVERY MINUTES (STAT): Performed by: STUDENT IN AN ORGANIZED HEALTH CARE EDUCATION/TRAINING PROGRAM

## 2024-05-29 PROCEDURE — 110454 HCHG SHELL REV 250: Performed by: STUDENT IN AN ORGANIZED HEALTH CARE EDUCATION/TRAINING PROGRAM

## 2024-05-29 PROCEDURE — A9270 NON-COVERED ITEM OR SERVICE: HCPCS | Performed by: ANESTHESIOLOGY

## 2024-05-29 PROCEDURE — 160046 HCHG PACU - 1ST 60 MINS PHASE II: Performed by: STUDENT IN AN ORGANIZED HEALTH CARE EDUCATION/TRAINING PROGRAM

## 2024-05-29 RX ORDER — CELECOXIB 200 MG/1
200 CAPSULE ORAL ONCE
Status: COMPLETED | OUTPATIENT
Start: 2024-05-29 | End: 2024-05-29

## 2024-05-29 RX ORDER — CEFAZOLIN SODIUM 1 G/3ML
INJECTION, POWDER, FOR SOLUTION INTRAMUSCULAR; INTRAVENOUS
Status: DISCONTINUED | OUTPATIENT
Start: 2024-05-29 | End: 2024-05-29 | Stop reason: HOSPADM

## 2024-05-29 RX ORDER — DIPHENHYDRAMINE HYDROCHLORIDE 50 MG/ML
12.5 INJECTION INTRAMUSCULAR; INTRAVENOUS
Status: DISCONTINUED | OUTPATIENT
Start: 2024-05-29 | End: 2024-05-29 | Stop reason: HOSPADM

## 2024-05-29 RX ORDER — HYDROMORPHONE HYDROCHLORIDE 1 MG/ML
0.4 INJECTION, SOLUTION INTRAMUSCULAR; INTRAVENOUS; SUBCUTANEOUS
Status: DISCONTINUED | OUTPATIENT
Start: 2024-05-29 | End: 2024-05-29 | Stop reason: HOSPADM

## 2024-05-29 RX ORDER — VANCOMYCIN HYDROCHLORIDE 1 G/20ML
INJECTION, POWDER, LYOPHILIZED, FOR SOLUTION INTRAVENOUS
Status: COMPLETED | OUTPATIENT
Start: 2024-05-29 | End: 2024-05-29

## 2024-05-29 RX ORDER — HYDRALAZINE HYDROCHLORIDE 20 MG/ML
5 INJECTION INTRAMUSCULAR; INTRAVENOUS
Status: DISCONTINUED | OUTPATIENT
Start: 2024-05-29 | End: 2024-05-29 | Stop reason: HOSPADM

## 2024-05-29 RX ORDER — OXYCODONE HCL 5 MG/5 ML
10 SOLUTION, ORAL ORAL
Status: COMPLETED | OUTPATIENT
Start: 2024-05-29 | End: 2024-05-29

## 2024-05-29 RX ORDER — ACETAMINOPHEN 500 MG
1000 TABLET ORAL ONCE
Status: COMPLETED | OUTPATIENT
Start: 2024-05-29 | End: 2024-05-29

## 2024-05-29 RX ORDER — ROCURONIUM BROMIDE 10 MG/ML
INJECTION, SOLUTION INTRAVENOUS PRN
Status: DISCONTINUED | OUTPATIENT
Start: 2024-05-29 | End: 2024-05-29 | Stop reason: SURG

## 2024-05-29 RX ORDER — ONDANSETRON 4 MG/1
4 TABLET, ORALLY DISINTEGRATING ORAL EVERY 6 HOURS PRN
Qty: 10 TABLET | Refills: 0 | Status: SHIPPED | OUTPATIENT
Start: 2024-05-29

## 2024-05-29 RX ORDER — MAGNESIUM SULFATE HEPTAHYDRATE 40 MG/ML
INJECTION, SOLUTION INTRAVENOUS PRN
Status: DISCONTINUED | OUTPATIENT
Start: 2024-05-29 | End: 2024-05-29 | Stop reason: SURG

## 2024-05-29 RX ORDER — BUPIVACAINE HYDROCHLORIDE AND EPINEPHRINE 5; 5 MG/ML; UG/ML
INJECTION, SOLUTION EPIDURAL; INTRACAUDAL; PERINEURAL
Status: DISCONTINUED | OUTPATIENT
Start: 2024-05-29 | End: 2024-05-29 | Stop reason: HOSPADM

## 2024-05-29 RX ORDER — ONDANSETRON 2 MG/ML
INJECTION INTRAMUSCULAR; INTRAVENOUS PRN
Status: DISCONTINUED | OUTPATIENT
Start: 2024-05-29 | End: 2024-05-29 | Stop reason: SURG

## 2024-05-29 RX ORDER — AMOXICILLIN 250 MG
1 CAPSULE ORAL DAILY
Qty: 30 TABLET | Refills: 0 | Status: SHIPPED | OUTPATIENT
Start: 2024-05-29

## 2024-05-29 RX ORDER — MIDAZOLAM HYDROCHLORIDE 1 MG/ML
INJECTION INTRAMUSCULAR; INTRAVENOUS PRN
Status: DISCONTINUED | OUTPATIENT
Start: 2024-05-29 | End: 2024-05-29 | Stop reason: SURG

## 2024-05-29 RX ORDER — HYDROMORPHONE HYDROCHLORIDE 1 MG/ML
0.2 INJECTION, SOLUTION INTRAMUSCULAR; INTRAVENOUS; SUBCUTANEOUS
Status: DISCONTINUED | OUTPATIENT
Start: 2024-05-29 | End: 2024-05-29 | Stop reason: HOSPADM

## 2024-05-29 RX ORDER — LIDOCAINE HYDROCHLORIDE 20 MG/ML
INJECTION, SOLUTION EPIDURAL; INFILTRATION; INTRACAUDAL; PERINEURAL PRN
Status: DISCONTINUED | OUTPATIENT
Start: 2024-05-29 | End: 2024-05-29 | Stop reason: SURG

## 2024-05-29 RX ORDER — OXYCODONE HYDROCHLORIDE 5 MG/1
5 TABLET ORAL EVERY 4 HOURS PRN
Qty: 42 TABLET | Refills: 0 | Status: SHIPPED | OUTPATIENT
Start: 2024-05-29 | End: 2024-06-05

## 2024-05-29 RX ORDER — CEFAZOLIN SODIUM 1 G/3ML
2 INJECTION, POWDER, FOR SOLUTION INTRAMUSCULAR; INTRAVENOUS ONCE
Status: COMPLETED | OUTPATIENT
Start: 2024-05-29 | End: 2024-05-29

## 2024-05-29 RX ORDER — SODIUM CHLORIDE, SODIUM LACTATE, POTASSIUM CHLORIDE, CALCIUM CHLORIDE 600; 310; 30; 20 MG/100ML; MG/100ML; MG/100ML; MG/100ML
INJECTION, SOLUTION INTRAVENOUS
Status: DISCONTINUED | OUTPATIENT
Start: 2024-05-29 | End: 2024-05-29 | Stop reason: SURG

## 2024-05-29 RX ORDER — HYDROMORPHONE HYDROCHLORIDE 1 MG/ML
0.5 INJECTION, SOLUTION INTRAMUSCULAR; INTRAVENOUS; SUBCUTANEOUS
Status: DISCONTINUED | OUTPATIENT
Start: 2024-05-29 | End: 2024-05-29 | Stop reason: HOSPADM

## 2024-05-29 RX ORDER — DEXAMETHASONE SODIUM PHOSPHATE 4 MG/ML
INJECTION, SOLUTION INTRA-ARTICULAR; INTRALESIONAL; INTRAMUSCULAR; INTRAVENOUS; SOFT TISSUE PRN
Status: DISCONTINUED | OUTPATIENT
Start: 2024-05-29 | End: 2024-05-29 | Stop reason: SURG

## 2024-05-29 RX ORDER — HALOPERIDOL 5 MG/ML
1 INJECTION INTRAMUSCULAR
Status: DISCONTINUED | OUTPATIENT
Start: 2024-05-29 | End: 2024-05-29 | Stop reason: HOSPADM

## 2024-05-29 RX ORDER — HYDROMORPHONE HYDROCHLORIDE 2 MG/ML
INJECTION, SOLUTION INTRAMUSCULAR; INTRAVENOUS; SUBCUTANEOUS PRN
Status: DISCONTINUED | OUTPATIENT
Start: 2024-05-29 | End: 2024-05-29 | Stop reason: SURG

## 2024-05-29 RX ORDER — SODIUM CHLORIDE, SODIUM LACTATE, POTASSIUM CHLORIDE, CALCIUM CHLORIDE 600; 310; 30; 20 MG/100ML; MG/100ML; MG/100ML; MG/100ML
INJECTION, SOLUTION INTRAVENOUS CONTINUOUS
Status: DISCONTINUED | OUTPATIENT
Start: 2024-05-29 | End: 2024-05-29 | Stop reason: HOSPADM

## 2024-05-29 RX ORDER — CYCLOBENZAPRINE HCL 10 MG
10 TABLET ORAL 3 TIMES DAILY PRN
Qty: 30 TABLET | Refills: 0 | Status: SHIPPED | OUTPATIENT
Start: 2024-05-29

## 2024-05-29 RX ORDER — OXYCODONE HCL 5 MG/5 ML
5 SOLUTION, ORAL ORAL
Status: COMPLETED | OUTPATIENT
Start: 2024-05-29 | End: 2024-05-29

## 2024-05-29 RX ORDER — ONDANSETRON 2 MG/ML
4 INJECTION INTRAMUSCULAR; INTRAVENOUS
Status: DISCONTINUED | OUTPATIENT
Start: 2024-05-29 | End: 2024-05-29 | Stop reason: HOSPADM

## 2024-05-29 RX ORDER — MEPERIDINE HYDROCHLORIDE 25 MG/ML
12.5 INJECTION INTRAMUSCULAR; INTRAVENOUS; SUBCUTANEOUS
Status: DISCONTINUED | OUTPATIENT
Start: 2024-05-29 | End: 2024-05-29 | Stop reason: HOSPADM

## 2024-05-29 RX ORDER — EPHEDRINE SULFATE 50 MG/ML
INJECTION, SOLUTION INTRAVENOUS PRN
Status: DISCONTINUED | OUTPATIENT
Start: 2024-05-29 | End: 2024-05-29 | Stop reason: SURG

## 2024-05-29 RX ORDER — GABAPENTIN 300 MG/1
300 CAPSULE ORAL ONCE
Status: COMPLETED | OUTPATIENT
Start: 2024-05-29 | End: 2024-05-29

## 2024-05-29 RX ADMIN — METHOCARBAMOL 1000 MG: 100 INJECTION, SOLUTION INTRAMUSCULAR; INTRAVENOUS at 12:32

## 2024-05-29 RX ADMIN — LIDOCAINE HYDROCHLORIDE 100 MG: 20 INJECTION, SOLUTION EPIDURAL; INFILTRATION; INTRACAUDAL at 10:35

## 2024-05-29 RX ADMIN — MIDAZOLAM HYDROCHLORIDE 2 MG: 1 INJECTION, SOLUTION INTRAMUSCULAR; INTRAVENOUS at 10:33

## 2024-05-29 RX ADMIN — ACETAMINOPHEN 1000 MG: 500 TABLET, FILM COATED ORAL at 08:11

## 2024-05-29 RX ADMIN — GABAPENTIN 300 MG: 300 CAPSULE ORAL at 08:11

## 2024-05-29 RX ADMIN — PROPOFOL 200 MG: 10 INJECTION, EMULSION INTRAVENOUS at 10:35

## 2024-05-29 RX ADMIN — HYDROMORPHONE HYDROCHLORIDE 1 MG: 2 INJECTION INTRAMUSCULAR; INTRAVENOUS; SUBCUTANEOUS at 10:35

## 2024-05-29 RX ADMIN — SUGAMMADEX 200 MG: 100 INJECTION, SOLUTION INTRAVENOUS at 11:36

## 2024-05-29 RX ADMIN — OXYCODONE HYDROCHLORIDE 10 MG: 5 SOLUTION ORAL at 12:08

## 2024-05-29 RX ADMIN — HYDROMORPHONE HYDROCHLORIDE 0.5 MG: 2 INJECTION INTRAMUSCULAR; INTRAVENOUS; SUBCUTANEOUS at 10:55

## 2024-05-29 RX ADMIN — HYDROMORPHONE HYDROCHLORIDE 0.5 MG: 2 INJECTION INTRAMUSCULAR; INTRAVENOUS; SUBCUTANEOUS at 11:39

## 2024-05-29 RX ADMIN — CELECOXIB 200 MG: 200 CAPSULE ORAL at 08:11

## 2024-05-29 RX ADMIN — MAGNESIUM SULFATE HEPTAHYDRATE 4 G: 2 INJECTION, SOLUTION INTRAVENOUS at 10:46

## 2024-05-29 RX ADMIN — EPHEDRINE SULFATE 10 MG: 50 INJECTION, SOLUTION INTRAVENOUS at 11:16

## 2024-05-29 RX ADMIN — ROCURONIUM BROMIDE 100 MG: 50 INJECTION, SOLUTION INTRAVENOUS at 10:35

## 2024-05-29 RX ADMIN — SODIUM CHLORIDE, POTASSIUM CHLORIDE, SODIUM LACTATE AND CALCIUM CHLORIDE: 600; 310; 30; 20 INJECTION, SOLUTION INTRAVENOUS at 10:31

## 2024-05-29 RX ADMIN — CEFAZOLIN 2 G: 1 INJECTION, POWDER, FOR SOLUTION INTRAMUSCULAR; INTRAVENOUS at 10:31

## 2024-05-29 RX ADMIN — DEXAMETHASONE SODIUM PHOSPHATE 10 MG: 4 INJECTION INTRA-ARTICULAR; INTRALESIONAL; INTRAMUSCULAR; INTRAVENOUS; SOFT TISSUE at 10:39

## 2024-05-29 RX ADMIN — ONDANSETRON 4 MG: 2 INJECTION INTRAMUSCULAR; INTRAVENOUS at 11:36

## 2024-05-29 ASSESSMENT — FIBROSIS 4 INDEX: FIB4 SCORE: 0.92

## 2024-05-29 ASSESSMENT — PAIN SCALES - GENERAL: PAIN_LEVEL: 2

## 2024-05-29 ASSESSMENT — PAIN DESCRIPTION - PAIN TYPE: TYPE: SURGICAL PAIN

## 2024-05-29 NOTE — OR NURSING
Pt arrives from OR to PACU at 1157. Pt identification verified by team, pt placed on all monitors with alarms audible, report and care of pt received from Anesthesiologist and RN. Assessment completed, pt changed into hospital gown and provided with warm blankets.      Danger to self

## 2024-05-29 NOTE — DISCHARGE INSTRUCTIONS
HOME CARE INSTRUCTIONS    ACTIVITY: Rest and take it easy for the first 24 hours.  A responsible adult is recommended to remain with you during that time.  It is normal to feel sleepy.  We encourage you to not do anything that requires balance, judgment or coordination.    FOR 24 HOURS DO NOT:  Drive, operate machinery or run household appliances.  Drink beer or alcoholic beverages.  Make important decisions or sign legal documents.    SPECIAL INSTRUCTIONS: follow instructions as given to you by MD    DIET: To avoid nausea, slowly advance diet as tolerated, avoiding spicy or greasy foods for the first day.  Add more substantial food to your diet according to your physician's instructions.  Babies can be fed formula or breast milk as soon as they are hungry.  INCREASE FLUIDS AND FIBER TO AVOID CONSTIPATION.    MEDICATIONS: Resume taking daily medication.  Take prescribed pain medication with food.  If no medication is prescribed, you may take non-aspirin pain medication if needed.  PAIN MEDICATION CAN BE VERY CONSTIPATING.  Take a stool softener or laxative such as senokot, pericolace, or milk of magnesia if needed.    Prescription given for _Flexeril, Zofran, Roxicodone, Senokot_.  Last pain medication given at 12:08 pm.    A follow-up appointment should be arranged with your doctor; call to schedule.    You should CALL YOUR PHYSICIAN if you develop:  Fever greater than 101 degrees F.  Pain not relieved by medication, or persistent nausea or vomiting.  Excessive bleeding (blood soaking through dressing) or unexpected drainage from the wound.  Extreme redness or swelling around the incision site, drainage of pus or foul smelling drainage.  Inability to urinate or empty your bladder within 8 hours.  Problems with breathing or chest pain.    You should call 911 if you develop problems with breathing or chest pain.  If you are unable to contact your doctor or surgical center, you should go to the nearest emergency room  or urgent care center.  Physician's telephone #: [phone]    MILD FLU-LIKE SYMPTOMS ARE NORMAL.  YOU MAY EXPERIENCE GENERALIZED MUSCLE ACHES, THROAT IRRITATION, HEADACHE AND/OR SOME NAUSEA.    If any questions arise, call your doctor.  If your doctor is not available, please feel free to call the Surgical Center at [phone].  The Center is open Monday through Friday from 7AM to 7PM.      A registered nurse may call you a few days after your surgery to see how you are doing after your procedure.    You may also receive a survey in the mail within the next two weeks and we ask that you take a few moments to complete the survey and return it to us.  Our goal is to provide you with very good care and we value your comments.     Depression / Suicide Risk    As you are discharged from this [facility] facility, it is important to learn how to keep safe from harming yourself.    Recognize the warning signs:  Abrupt changes in personality, positive or negative- including increase in energy   Giving away possessions  Change in eating patterns- significant weight changes-  positive or negative  Change in sleeping patterns- unable to sleep or sleeping all the time   Unwillingness or inability to communicate  Depression  Unusual sadness, discouragement and loneliness  Talk of wanting to die  Neglect of personal appearance   Rebelliousness- reckless behavior  Withdrawal from people/activities they love  Confusion- inability to concentrate     If you or a loved one observes any of these behaviors or has concerns about self-harm, here's what you can do:  Talk about it- your feelings and reasons for harming yourself  Remove any means that you might use to hurt yourself (examples: pills, rope, extension cords, firearm)  Get professional help from the community (Mental Health, Substance Abuse, psychological counseling)  Do not be alone:Call your Safe Contact- someone whom you trust who will be there for you.  Call your  local CRISIS HOTLINE 757-5612 or 160-752-0002  Call your local Children's Mobile Crisis Response Team [place] (792) 321-2748 or www.[employer]  Call the toll free National Suicide Prevention Hotlines   National Suicide Prevention Lifeline 224-353-LUYA (7684)  [facility] Line Network 800-SUICIDE (688-2574)    I acknowledge receipt and understanding of these Home Care instructions.

## 2024-05-29 NOTE — OP REPORT
PREOPERATIVE DIAGNOSIS(ES): Left-sided disc herniation at L2-3 with radiculopathy     POSTOPERATIVE DIAGNOSIS(ES): same     PROCEDURE: Posterior decompression using laminotomy and diskectomy at L2-3     ANESTHESIA: General endotracheal.     SURGEON(S): Russell Wynn MD     ASSISTANT: Shani More PA-C     ESTIMATED BLOOD LOSS: 50 cc.     TRANSFUSIONS: None.     IMPLANTS: None.     SPECIMENS: None     COMPLICATIONS: None.     DISPOSITION: The patient was transferred to the recovery room at the end of the case in stable condition.     INDICATIONS:  48 y.o. male that presents with low back pain left lower extremity pain numbness and weakness that has been present for 1 month duration.  Left lower extremity pain radiates down the medial thigh and calf as well as the lateral thigh and calf.  Has numbness in a similar distribution.  Has had persistent weakness with a dropfoot.  Significant weakness on exam.  MRI demonstrated a extradural mass posterior L3 vertebra consistent with a disc extrusion.  Significant stenosis at the L3-4 level.  Repeat MRI with and without contrast was negative for any significant masses the disc extrusion was smaller but still present.  Due to the presence of neurologic I offered the patient the surgery above.    Prior to surgery, the patient was told the nature of the surgical procedure, the alternatives of surgery, the risks and benefits of the operation. The risks of surgery include, but not limited to, nerve root injury, cerebrospinal fluid leak, incomplete resolution of symptoms, iatrogenic destabilization, recurrent disk herniation, wound infection, and general medical and anesthetic complications. Despite the risks, the patient elected to proceed with surgery.     DESCRIPTION OF PROCEDURE: The patient was identified in the holding area by his name, medical record number, and date of birth. The surgical site was marked, and preoperative antibiotics were administered. The patient was  transferred to the operating room where general anesthesia was induced. Sequential compression devices were placed and activated. The patient was placed in the prone position on the operating room table with all bony prominences padded. The low back was prepped and draped in standard fashion. A surgical time-out was performed. A longitudinal incision was made extending from the spinous process of L3 to the spinous process of L4. This incision was carried down through subcutaneous tissue exposing the underlying fascia. The fascia was split longitudinally on the left side only exposing the lamina and pars interarticularis at L3-4. Lateral radiograph confirmed the surgical levels. A curette was used to release the ligamentum flavum from the superior aspect of the L4 lamina. A 4 mm Kerrison was used to resect the ligamentum flavum. A 3 mm Kerrison was used to resect a portion of the superior aspect of the L4 lamina and the inferior aspect of the L3 lamina. The lateral recess was generously decompressed. The L4 nerve root and thecal sac were retracted medially at the level of the disk space. This allowed visualization of a the underlying disk herniation. This was removed using a pituitary rongeur and completely relieved the residual pressure on the L4 nerve root. A dorsal foraminotomy was made over the L4 nerve root to provide additional mobility. Palpation using a Rosalva elevator confirmed the adequacy of the decompression. The wound was irrigated with a liter of crystalloid containing antibiotics. Repeat inspection of the disk space demonstrated no further fragments. There was very little bleeding, and a decision was made to forego the use of a drain. The fascia was closed using interrupted #1 Vicryl sutures. The superficial subcutaneous tissue was closed using interrupted 2-0 Vicryl sutures. The skin was closed using running 4-0 Stratafix suture. A sterile bandage was placed. The patient was returned to the supine  position where he was extubated without incident and transferred to the recovery room in stable condition. At the end of the operation, all sponge, needle, and instrument counts were correct. I was present for and performed all critical aspects of the surgery. There were no complications associated with the surgery.

## 2024-05-29 NOTE — ANESTHESIA POSTPROCEDURE EVALUATION
Patient: Donnie Horton    Procedure Summary       Date: 05/29/24 Room / Location: [facility] OR 08 / SURGERY [facility]    Anesthesia Start: 1031 Anesthesia Stop: 1159    Procedure: Left L3-4 Microdiscectomy (Left: Spine Lumbar) Diagnosis:       Spinal stenosis of lumbar region with radiculopathy      (Spinal stenosis of lumbar region with radiculopathy)    Surgeons: Russell Wynn M.D. Responsible Provider: Anthony Lee M.D.    Anesthesia Type: general ASA Status: 2            Final Anesthesia Type: general  Last vitals  BP   Blood Pressure: (!) 173/63    Temp   36.2 °C (97.1 °F)    Pulse   73   Resp   17    SpO2   97 %      Anesthesia Post Evaluation    Patient location during evaluation: PACU  Patient participation: complete - patient participated  Level of consciousness: awake and alert  Pain score: 2    Airway patency: patent  Anesthetic complications: no  Cardiovascular status: hemodynamically stable  Respiratory status: acceptable  Hydration status: euvolemic    PONV: none          No notable events documented.     Nurse Pain Score: 0 (NPRS)

## 2024-05-29 NOTE — ANESTHESIA TIME REPORT
Anesthesia Start and Stop Event Times       Date Time Event    5/29/2024 0829 Ready for Procedure     1031 Anesthesia Start     1159 Anesthesia Stop          Responsible Staff  05/29/24      Name Role Begin End    Anthony Lee M.D. Anesth 1031 1159          Overtime Reason:  no overtime (within assigned shift)    Comments:

## 2024-05-29 NOTE — ANESTHESIA PREPROCEDURE EVALUATION
Case: [id number] Date/Time: 05/29/24 0945    Procedure: Left L3-4 miscrodiscectomy    Diagnosis: Spinal stenosis of lumbar region with radiculopathy [M48.061, M54.16]    Pre-op diagnosis: Spinal stenosis of lumbar region with radiculopathy [M48.061, M54.16]    Location: [place] [address] / SURGERY [facility]    Surgeons: Russell Wynn M.D.          HTN, GERD, BPH, former smoker  Denies: MI/CHF/CVA/DM/CKD    Relevant Problems   CARDIAC   (positive) Essential hypertension      GI   (positive) GERD (gastroesophageal reflux disease)       Physical Exam    Airway   Mallampati: II  TM distance: >3 FB  Neck ROM: full       Cardiovascular - normal exam  Rhythm: regular  Rate: normal  (-) murmur     Dental - normal exam           Pulmonary - normal exam  Breath sounds clear to auscultation     Abdominal    Neurological - normal exam                   Anesthesia Plan    ASA 2       Plan - general       Airway plan will be ETT          Induction: intravenous    Postoperative Plan: Postoperative administration of opioids is intended.    Pertinent diagnostic labs and testing reviewed    Informed Consent:    Anesthetic plan and risks discussed with patient.    Use of blood products discussed with: patient whom consented to blood products.

## 2024-05-29 NOTE — OR NURSING
Pt ambulated in hallway; gate steady, RN at side. Pt was able to void without difficulty.  Pt verbalizes readiness for discharge. Instructions reviewed with pt by Sunshine COREAS. No further needs. Pt taken to car via wheelchair by CNA.

## 2024-05-29 NOTE — ANESTHESIA PROCEDURE NOTES
Airway    Date/Time: 5/29/2024 10:36 AM    Performed by: Anthony Lee M.D.  Authorized by: Anthony Lee M.D.    Location:  OR  Urgency:  Elective  Difficult Airway: No    Indications for Airway Management:  Anesthesia      Spontaneous Ventilation: absent    Sedation Level:  Deep  Preoxygenated: Yes    Patient Position:  Sniffing  Mask Difficulty Assessment:  2 - vent by mask + OA or adjuvant +/- NMBA  Final Airway Type:  Endotracheal airway  Final Endotracheal Airway:  ETT  Cuffed: Yes    Technique Used for Successful ETT Placement:  Direct laryngoscopy    Insertion Site:  Oral  Blade Type:  Burton  Laryngoscope Blade/Videolaryngoscope Blade Size:  2  ETT Size (mm):  7.0  Measured from:  Lips  ETT to Lips (cm):  24  Placement Verified by: auscultation and capnometry    Cormack-Lehane Classification:  Grade IIa - partial view of glottis  Number of Attempts at Approach:  1

## 2024-05-29 NOTE — PROGRESS NOTES
Medication history reviewed with PT at [facility] is complete per PT reporting    Allergies reviewed.     Patient denies any outpatient antibiotics in the last 30 days.     Patient is not taking anticoagulants.    Preferred pharmacy for this visit - [facility] on [address] ([phone])

## 2024-05-29 NOTE — LETTER
May 9, 2024    Patient Name: Donnie Horton  Surgeon Name: Russell Wynn M.D.  Surgery Facility: [facility] ([address] [place] [address])  Surgery Date: 5/29/2024    The time of your surgery is not final and may change up to and until the day of your surgery. You will be contacted 1-2 business days prior to your surgery date with your check-in and surgery time.    BEFORE YOUR SURGERY - WITH THE FACILITY  Pre Registration and/or Lab Work/Tests must be done within 60 days of your surgery date. These will be done at [facility], with an appointment. This appointment should be completed before your pre op appointment in our office.    On 5/20 - if you have not already heard from [facility] Pre Admission/Registration Department, please call them at [phone] option 2, then option 1, for an appointment.      BEFORE YOUR SURGERY - WITH YOUR SURGEONS OFFICE  Pre op Appointment:   Date: 05/23/24   Time: 12:00PM   Provider: Shani More PA-C    Location: [address] [place] [address]    Bring a list of all medications you are currently taking including the dosing and frequency.    Please read the MEDICATION INSTRUCTIONS below completely.    DAY OF YOUR SURGERY  Nothing to eat or drink and refrain from smoking any substance after midnight the day of your surgery. Smoking may interfere with the anesthetic and frequently produces nausea during the recovery period.    Continue taking all lifesaving medications, including the morning of your surgery with small sip of water.    You will need a responsible adult to drive you to and from your surgery.    AFTER YOUR SURGERY  2 Week Post op Appointment:   Date: 06/12/24   Time: 12:00PM  With: Shani More PA-C   Location: [address] [place] [address]    MEDICATION INSTRUCTIONS  Do not take these medications prior to your procedure:            Anti-inflammatories: stop 7 days prior, restart when advised. For fusions avoid for 12 weeks after surgery            Naproxen  (Naprosyn or Alleve)            Motrin            Ibuprofen            Nabumetone (Relafen)            Meloxicam (Mobic)            Celebrex            Salsalate            Diclofenac (Arthrotec, Voltaren, Flector)            Sulindac (Clinoril            Etodolac (Lodine)            Indomethacin (Indocin)            Ketoprofen            Ketorolac            Oxaprozin (Daypro)            Piroxicam (Feldene)            Blood thinners (stop after approval from the prescribing physician)            Aspirin (any dosage): Stop 14 days prior, restart 7 days after procedure            Any medications that contain aspirin in combination (ie: Excedrin migraine, Fiorinal, and Norgesic)            Warfarin (Coumadin): Stop 14 days prior, INR day of procedure, restart 2-3 weeks after procedure            Antiplatelet: Stop 14 days prior, restart 7 days after procedure            Ticlid (ticlopidine): Stop 14 days prior            Plavix (clopidogrel): Stop 7 days prior            Aggrenox or Dipyridamole: Stop 14 days prior            ReoPro (abciximab): Stop 14 days prior            Integrilin (eptifibatide): Stop 14 days prior            Aggrastat (tirofiban): Stop 14 days prior            Lovenox (Enoxaparin): Stop 24hrs before and restart 24hrs after procedure            Heparin: Stop 24hrs before             Dalteparin (Fragmin): Stop 24hrs before            Fondaparinux (Arixtra): Stop 24hrs before            Xeralto, Dabigatran (Pradaxa) Stop 5 days prior            Eliquis (apibaxan) Stop 7 days prior    Okay to take these medications as prescribed:            Muscle relaxers            Acetaminophen and pain medications that have it in addition to oxycodone and hydrocodone            Blood pressure, cholesterol and diabetes medications are ok      TIME OFF WORK  FMLA or Disability forms can be faxed directly to [phone] or you may drop them off at any [facility]. Our office charges a $35.00 fee.  Forms will be completed within 5-10 business days after payment is received. For the status of your forms you may contact our disability office directly at [phone].    DENTAL PROCDURES/CLEANINGS Avoid 3 weeks before surgery and for 3 months after surgery.    QUESTIONS ABOUT COSTS  Contact our Patient Financial Services department at [phone] for more information.    If you have any questions, please contact our office.    Hayde   Surgery Scheduler  [email or web address]  ? [phone]   Fax: [phone]  EXT [address]  [place] [address]  [phone]

## 2025-01-22 SDOH — HEALTH STABILITY: MENTAL HEALTH
STRESS IS WHEN SOMEONE FEELS TENSE, NERVOUS, ANXIOUS, OR CAN'T SLEEP AT NIGHT BECAUSE THEIR MIND IS TROUBLED. HOW STRESSED ARE YOU?: ONLY A LITTLE

## 2025-01-22 SDOH — HEALTH STABILITY: PHYSICAL HEALTH: ON AVERAGE, HOW MANY MINUTES DO YOU ENGAGE IN EXERCISE AT THIS LEVEL?: 60 MIN

## 2025-01-22 SDOH — HEALTH STABILITY: PHYSICAL HEALTH: ON AVERAGE, HOW MANY DAYS PER WEEK DO YOU ENGAGE IN MODERATE TO STRENUOUS EXERCISE (LIKE A BRISK WALK)?: 3 DAYS

## 2025-01-22 SDOH — ECONOMIC STABILITY: TRANSPORTATION INSECURITY
IN THE PAST 12 MONTHS, HAS LACK OF TRANSPORTATION KEPT YOU FROM MEETINGS, WORK, OR FROM GETTING THINGS NEEDED FOR DAILY LIVING?: NO

## 2025-01-22 SDOH — ECONOMIC STABILITY: TRANSPORTATION INSECURITY
IN THE PAST 12 MONTHS, HAS THE LACK OF TRANSPORTATION KEPT YOU FROM MEDICAL APPOINTMENTS OR FROM GETTING MEDICATIONS?: NO

## 2025-01-22 SDOH — ECONOMIC STABILITY: TRANSPORTATION INSECURITY
IN THE PAST 12 MONTHS, HAS LACK OF RELIABLE TRANSPORTATION KEPT YOU FROM MEDICAL APPOINTMENTS, MEETINGS, WORK OR FROM GETTING THINGS NEEDED FOR DAILY LIVING?: NO

## 2025-01-22 SDOH — ECONOMIC STABILITY: HOUSING INSECURITY
IN THE LAST 12 MONTHS, WAS THERE A TIME WHEN YOU DID NOT HAVE A STEADY PLACE TO SLEEP OR SLEPT IN A SHELTER (INCLUDING NOW)?: NO

## 2025-01-22 SDOH — ECONOMIC STABILITY: FOOD INSECURITY: WITHIN THE PAST 12 MONTHS, YOU WORRIED THAT YOUR FOOD WOULD RUN OUT BEFORE YOU GOT MONEY TO BUY MORE.: NEVER TRUE

## 2025-01-22 SDOH — ECONOMIC STABILITY: FOOD INSECURITY: WITHIN THE PAST 12 MONTHS, THE FOOD YOU BOUGHT JUST DIDN'T LAST AND YOU DIDN'T HAVE MONEY TO GET MORE.: NEVER TRUE

## 2025-01-22 SDOH — ECONOMIC STABILITY: INCOME INSECURITY: HOW HARD IS IT FOR YOU TO PAY FOR THE VERY BASICS LIKE FOOD, HOUSING, MEDICAL CARE, AND HEATING?: NOT VERY HARD

## 2025-01-22 SDOH — ECONOMIC STABILITY: INCOME INSECURITY: IN THE LAST 12 MONTHS, WAS THERE A TIME WHEN YOU WERE NOT ABLE TO PAY THE MORTGAGE OR RENT ON TIME?: NO

## 2025-01-22 ASSESSMENT — SOCIAL DETERMINANTS OF HEALTH (SDOH)
HOW OFTEN DO YOU ATTENT MEETINGS OF THE CLUB OR ORGANIZATION YOU BELONG TO?: NEVER
HOW OFTEN DO YOU HAVE A DRINK CONTAINING ALCOHOL: 2-4 TIMES A MONTH
IN A TYPICAL WEEK, HOW MANY TIMES DO YOU TALK ON THE PHONE WITH FAMILY, FRIENDS, OR NEIGHBORS?: TWICE A WEEK
HOW OFTEN DO YOU ATTENT MEETINGS OF THE CLUB OR ORGANIZATION YOU BELONG TO?: NEVER
DO YOU BELONG TO ANY CLUBS OR ORGANIZATIONS SUCH AS CHURCH GROUPS UNIONS, FRATERNAL OR ATHLETIC GROUPS, OR SCHOOL GROUPS?: NO
WITHIN THE PAST 12 MONTHS, YOU WORRIED THAT YOUR FOOD WOULD RUN OUT BEFORE YOU GOT THE MONEY TO BUY MORE: NEVER TRUE
IN THE PAST 12 MONTHS, HAS THE ELECTRIC, GAS, OIL, OR WATER COMPANY THREATENED TO SHUT OFF SERVICE IN YOUR HOME?: NO
HOW OFTEN DO YOU ATTEND CHURCH OR RELIGIOUS SERVICES?: NEVER
HOW OFTEN DO YOU GET TOGETHER WITH FRIENDS OR RELATIVES?: ONCE A WEEK
HOW MANY DRINKS CONTAINING ALCOHOL DO YOU HAVE ON A TYPICAL DAY WHEN YOU ARE DRINKING: 5 OR 6
IN A TYPICAL WEEK, HOW MANY TIMES DO YOU TALK ON THE PHONE WITH FAMILY, FRIENDS, OR NEIGHBORS?: TWICE A WEEK
HOW HARD IS IT FOR YOU TO PAY FOR THE VERY BASICS LIKE FOOD, HOUSING, MEDICAL CARE, AND HEATING?: NOT VERY HARD
HOW OFTEN DO YOU GET TOGETHER WITH FRIENDS OR RELATIVES?: ONCE A WEEK
DO YOU BELONG TO ANY CLUBS OR ORGANIZATIONS SUCH AS CHURCH GROUPS UNIONS, FRATERNAL OR ATHLETIC GROUPS, OR SCHOOL GROUPS?: NO
HOW OFTEN DO YOU ATTEND CHURCH OR RELIGIOUS SERVICES?: NEVER
HOW OFTEN DO YOU HAVE SIX OR MORE DRINKS ON ONE OCCASION: MONTHLY

## 2025-01-22 ASSESSMENT — LIFESTYLE VARIABLES
HOW OFTEN DO YOU HAVE A DRINK CONTAINING ALCOHOL: 2-4 TIMES A MONTH
HOW MANY STANDARD DRINKS CONTAINING ALCOHOL DO YOU HAVE ON A TYPICAL DAY: 5 OR 6
SKIP TO QUESTIONS 9-10: 0
HOW OFTEN DO YOU HAVE SIX OR MORE DRINKS ON ONE OCCASION: MONTHLY
AUDIT-C TOTAL SCORE: 6

## 2025-01-23 ENCOUNTER — APPOINTMENT (OUTPATIENT)
Dept: MEDICAL GROUP | Facility: MEDICAL CENTER | Age: 50
End: 2025-01-23
Payer: COMMERCIAL

## 2025-01-23 VITALS
SYSTOLIC BLOOD PRESSURE: 138 MMHG | TEMPERATURE: 96.9 F | HEART RATE: 60 BPM | BODY MASS INDEX: 31.46 KG/M2 | OXYGEN SATURATION: 98 % | HEIGHT: 71 IN | DIASTOLIC BLOOD PRESSURE: 88 MMHG | WEIGHT: 224.7 LBS

## 2025-01-23 DIAGNOSIS — M48.061 SPINAL STENOSIS OF LUMBAR REGION WITH RADICULOPATHY: ICD-10-CM

## 2025-01-23 DIAGNOSIS — Z23 NEED FOR VACCINATION: ICD-10-CM

## 2025-01-23 DIAGNOSIS — M54.16 SPINAL STENOSIS OF LUMBAR REGION WITH RADICULOPATHY: ICD-10-CM

## 2025-01-23 DIAGNOSIS — I10 ESSENTIAL HYPERTENSION: Primary | ICD-10-CM

## 2025-01-23 DIAGNOSIS — R20.0 LEFT LEG NUMBNESS: ICD-10-CM

## 2025-01-23 DIAGNOSIS — K21.9 GASTROESOPHAGEAL REFLUX DISEASE WITHOUT ESOPHAGITIS: ICD-10-CM

## 2025-01-23 PROBLEM — E66.9 OBESITY (BMI 30-39.9): Status: ACTIVE | Noted: 2025-01-23

## 2025-01-23 PROCEDURE — 3079F DIAST BP 80-89 MM HG: CPT | Performed by: STUDENT IN AN ORGANIZED HEALTH CARE EDUCATION/TRAINING PROGRAM

## 2025-01-23 PROCEDURE — 90471 IMMUNIZATION ADMIN: CPT

## 2025-01-23 PROCEDURE — 90472 IMMUNIZATION ADMIN EACH ADD: CPT

## 2025-01-23 PROCEDURE — 90746 HEPB VACCINE 3 DOSE ADULT IM: CPT

## 2025-01-23 PROCEDURE — 99214 OFFICE O/P EST MOD 30 MIN: CPT | Mod: 25 | Performed by: STUDENT IN AN ORGANIZED HEALTH CARE EDUCATION/TRAINING PROGRAM

## 2025-01-23 PROCEDURE — 3075F SYST BP GE 130 - 139MM HG: CPT | Performed by: STUDENT IN AN ORGANIZED HEALTH CARE EDUCATION/TRAINING PROGRAM

## 2025-01-23 PROCEDURE — 90656 IIV3 VACC NO PRSV 0.5 ML IM: CPT

## 2025-01-23 RX ORDER — AMLODIPINE BESYLATE 10 MG/1
10 TABLET ORAL DAILY
Qty: 30 TABLET | Refills: 11 | Status: SHIPPED | OUTPATIENT
Start: 2025-01-23

## 2025-01-23 RX ORDER — FAMOTIDINE 20 MG/1
20 TABLET, FILM COATED ORAL NIGHTLY
Qty: 90 TABLET | Refills: 1 | Status: SHIPPED | OUTPATIENT
Start: 2025-01-23

## 2025-01-23 ASSESSMENT — PATIENT HEALTH QUESTIONNAIRE - PHQ9: CLINICAL INTERPRETATION OF PHQ2 SCORE: 0

## 2025-01-23 ASSESSMENT — FIBROSIS 4 INDEX: FIB4 SCORE: 0.92

## 2025-01-23 NOTE — PROGRESS NOTES
Subjective:     CC:   Chief Complaint   Patient presents with    Ear Pain    Pharyngitis     Pain and swelling     Foot Problem       HPI:   Donnie presents today with    Verbal consent was acquired by the patient to use [employer] ambient listening note generation during this visit Yes   History of Present Illness  The patient presents for evaluation of numbness in his leg, eye issues, heartburn, and hypertension.    He reports persistent numbness in his leg, although he has experienced some improvement in his mobility. He is not experiencing back pain. Despite attempts to schedule a follow-up appointment with his back specialist, he has been unsuccessful in reaching them. He was previously informed that therapy would be initiated after a 90-day period, but he has not received any further communication regarding this. He also reports occasional knee cramps. He has undergone back surgery and was advised that recovery would be gradual. He expresses concern about potential issues with his knee or foot. He is able to lift weights and perform most activities, but the numbness remains a significant concern. He is not currently experiencing pain and has regained the ability to bend.    He experienced an episode of eye swelling last week, which he initially suspected to be conjunctivitis due to its pinkish hue. However, the condition resolved spontaneously the following day. He also reported concurrent ear pain and tonsillar discomfort. He noticed a temporary blurring of vision during this period, which has since resolved.    He continues to experience heartburn, particularly after consuming spicy foods such as tacos. He has not been adhering to the prescribed Pepcid regimen. He reports that the heartburn is accompanied by chest pain.    His blood pressure readings at home have been consistently elevated, ranging from 135 to 140 systolic and 80 to 90 diastolic. He has been managing his hypertension with amlodipine, but  "admits to occasional non-compliance with the medication. He has been incorporating beet powder into his morning smoothies, which he believes has contributed to a decrease in his blood pressure readings. He recalls one instance where his blood pressure was 125/82 in the morning. He also notes that his blood pressure tends to increase when he does not get adequate sleep. He experienced leg swelling during a recent trip to [facility], but this symptom resolved upon his return.    MEDICATIONS  Current: Amlodipine         Health Maintenance: Completed    ROS:  ROS    Review of systems unremarkable except for concerns noted by patient or items listed.    Please see HPI for additional ROS.      Objective:     Exam:  BP (!) 140/90 (BP Location: Right arm, Patient Position: Sitting, BP Cuff Size: Adult long)   Pulse 60   Temp 36.1 °C (96.9 °F) (Temporal)   Ht 1.803 m (5' 11\")   Wt 102 kg (224 lb 11.2 oz)   SpO2 98%   BMI 31.34 kg/m²  Body mass index is 31.34 kg/m².    Physical Exam  Constitutional:       General: He is not in acute distress.     Appearance: Normal appearance.   HENT:      Head: Normocephalic.   Cardiovascular:      Rate and Rhythm: Normal rate and regular rhythm.      Pulses: Normal pulses.      Heart sounds: Normal heart sounds.   Pulmonary:      Effort: Pulmonary effort is normal.      Breath sounds: Normal breath sounds.   Skin:     General: Skin is warm.   Neurological:      Mental Status: He is alert and oriented to person, place, and time.   Psychiatric:         Mood and Affect: Mood normal.         Behavior: Behavior normal.             Labs: reviewed    Assessment & Plan:     49 y.o. male with the following -     1. Spinal stenosis of lumbar region with radiculopathy (Primary)  2. Left leg numbness  Chronic problem   Status post microdiscectomy done with [facility]   Reports had improvement initially and back pain is better but now gradual increase in the   sciatica pain in the left leg "   He wants to return to [facility] for further evaluation   Agrees with trying physical therapy at the same place  - Likely due to a pinched sciatic nerve from previous disc issue  - Numbness has improved but persists  - Recommend physical therapy  - Referral to [facility] for physical therapy  - Follow up with back specialist  - Consider gabapentin if numbness becomes too bothersome  - Referral to Physical Therapy      3. Need for vaccination  - INFLUENZA VACCINE TRI INJ (PF)  - Hepatitis B Vaccine Adult IM    4. Gastroesophageal reflux disease without esophagitis  Chronic condition, not well controlled   Denies:   - heartburn, epigastric pain.   - nausea, vomiting, or diarrhea  - dysphagia  - abnormal cough  - blood in the stool or dark tarry stools.  - early satiety  - tobacco use.  Plan:  Appropriate counseling given.  Discussed - weight loss, elevated the head of the bed, avoid common food triggers (fatty or fried foods, tomato sauce, alcohol, chocolate, mint, garlic, onion, and caffeine)  Discussed side effects --> Increased risks of vitamin/mineral deficiency, GI infection, and bone loss associated with PPI.   Patient verbalized understanding     - famotidine (PEPCID) 20 MG Tab; Take 1 Tablet by mouth every evening.  Dispense: 90 Tablet; Refill: 1    5. Essential hypertension  Chronic, unstable  Today BP initially 140/90 later 138/88   At home reports avg 135-145/80-90  Denies chest pain, SOB, dizziness or vision changes.   Plan:  Continue current medications  - amlodipine. Dose increased to 10 mg daily    Instructions provided   - check home BP regularly at least 1-2 times a week and keep log  - return to clinic in 3 months (or sooner if home BP is persistently elevated above goal).   - alcohol cessation   - regular exercises   - avoid illicit drugs and tobacco  - DASH diet , low salt diet   - weight loss.     - amLODIPine (NORVASC) 10 MG Tab; Take 1 Tablet by mouth every day.  Dispense: 30 Tablet;  Refill: 11      Please note that this dictation was created using voice recognition software. I have made every reasonable attempt to correct obvious errors, but I expect that there are errors of grammar and possibly content that I did not discover before finalizing the note.

## 2025-05-16 ENCOUNTER — OFFICE VISIT (OUTPATIENT)
Dept: MEDICAL GROUP | Facility: MEDICAL CENTER | Age: 50
End: 2025-05-16
Payer: COMMERCIAL

## 2025-05-16 VITALS
BODY MASS INDEX: 30.25 KG/M2 | OXYGEN SATURATION: 99 % | WEIGHT: 216.1 LBS | HEIGHT: 71 IN | SYSTOLIC BLOOD PRESSURE: 100 MMHG | HEART RATE: 63 BPM | DIASTOLIC BLOOD PRESSURE: 70 MMHG | TEMPERATURE: 97.1 F

## 2025-05-16 DIAGNOSIS — K21.9 GASTROESOPHAGEAL REFLUX DISEASE WITHOUT ESOPHAGITIS: ICD-10-CM

## 2025-05-16 DIAGNOSIS — Z12.5 PROSTATE CANCER SCREENING: ICD-10-CM

## 2025-05-16 DIAGNOSIS — M54.6 ACUTE MIDLINE THORACIC BACK PAIN: Primary | ICD-10-CM

## 2025-05-16 DIAGNOSIS — Z00.00 WELLNESS EXAMINATION: ICD-10-CM

## 2025-05-16 DIAGNOSIS — Z12.11 COLON CANCER SCREENING: ICD-10-CM

## 2025-05-16 DIAGNOSIS — I10 ESSENTIAL HYPERTENSION: ICD-10-CM

## 2025-05-16 PROCEDURE — 3078F DIAST BP <80 MM HG: CPT | Performed by: STUDENT IN AN ORGANIZED HEALTH CARE EDUCATION/TRAINING PROGRAM

## 2025-05-16 PROCEDURE — 3074F SYST BP LT 130 MM HG: CPT | Performed by: STUDENT IN AN ORGANIZED HEALTH CARE EDUCATION/TRAINING PROGRAM

## 2025-05-16 PROCEDURE — 99214 OFFICE O/P EST MOD 30 MIN: CPT | Performed by: STUDENT IN AN ORGANIZED HEALTH CARE EDUCATION/TRAINING PROGRAM

## 2025-05-16 RX ORDER — FAMOTIDINE 20 MG/1
20 TABLET, FILM COATED ORAL NIGHTLY
Qty: 90 TABLET | Refills: 1 | Status: SHIPPED | OUTPATIENT
Start: 2025-05-16

## 2025-05-16 RX ORDER — AMLODIPINE BESYLATE 10 MG/1
10 TABLET ORAL DAILY
Qty: 90 TABLET | Refills: 1 | Status: SHIPPED | OUTPATIENT
Start: 2025-05-16

## 2025-05-16 RX ORDER — CYCLOBENZAPRINE HCL 5 MG
5-10 TABLET ORAL
Qty: 60 TABLET | Refills: 0 | Status: SHIPPED | OUTPATIENT
Start: 2025-05-16

## 2025-05-16 ASSESSMENT — FIBROSIS 4 INDEX: FIB4 SCORE: 0.93

## 2025-05-16 NOTE — PROGRESS NOTES
Subjective:     CC:   Chief Complaint   Patient presents with    Back Pain       HPI:   Donnie presents today with    Verbal consent was acquired by the patient to use [employer] ambient listening note generation during this visit Yes   History of Present Illness  The patient presents for evaluation of back pain, hypertension, and acid reflux.    He reports persistent back pain, which he attributes to a recent change in his mattress. The pain is localized to the spine rather than the muscles and has been present for approximately 2 weeks. He rates the pain as an 8 on a scale of 10 upon waking this morning, with occasional fluctuations to a 5 or 6. Despite the pain, he continues to attend the gym, albeit with reduced intensity post-surgery. He recalls a period of intense coughing accompanied by phlegm production 2 weeks ago but is uncertain if this is related to his current back pain. He is not currently taking any analgesics for the pain but has previously found relief with Flexeril, although it induced fatigue. He has a history of L3-L4 surgery.    He is currently on antihypertensive medication and reports improved blood pressure control. However, he experiences occasional dizziness and nausea, which he suspects may be due to hypotension. He maintains adequate hydration and monitors his blood pressure at home, noting that it tends to be on the lower side. He reports no leg swelling, except for a single episode while traveling abroad.    He is also on Pepcid, which he takes as needed, but occasionally forgets to administer.    He underwent a Cologuard test in 03/2024 and has followed all instructions, including returning the kit via [employer].    PAST SURGICAL HISTORY:   L3-L4 surgery       Health Maintenance: Completed    ROS:  ROS    Review of systems unremarkable except for concerns noted by patient or items listed.    Please see HPI for additional ROS.      Objective:     Exam:  /70 (BP Location: Left arm,  "Patient Position: Sitting, BP Cuff Size: Adult)   Pulse 63   Temp 36.2 °C (97.1 °F) (Temporal)   Ht 1.803 m (5' 11\")   Wt 98 kg (216 lb 1.6 oz)   SpO2 99%   BMI 30.14 kg/m²  Body mass index is 30.14 kg/m².    Physical Exam  Constitutional:       General: He is not in acute distress.     Appearance: Normal appearance.   HENT:      Head: Normocephalic.   Cardiovascular:      Rate and Rhythm: Normal rate and regular rhythm.      Pulses: Normal pulses.      Heart sounds: Normal heart sounds.   Pulmonary:      Effort: Pulmonary effort is normal.      Breath sounds: Normal breath sounds.   Musculoskeletal:         General: Tenderness present. No swelling or deformity.      Cervical back: Tenderness present.        Back:       Right lower leg: No edema.      Comments: No red flag signs except for paraspinal and midline tenderness in thoracic region   Skin:     General: Skin is warm.   Neurological:      Mental Status: He is alert and oriented to person, place, and time.   Psychiatric:         Mood and Affect: Mood normal.         Behavior: Behavior normal.             Labs: reviewed    Assessment & Plan:     50 y.o. male with the following -     1. Acute midline thoracic back pain (Primary)  Acute   Ongoing for 2 weeks  - Etiology could be muscular  - X-ray of the back will be ordered to rule out structural issues.  - Prescription for Flexeril 5 mg, total of 60 pills, advised to take 2 pills at night and 1 pill during the day, up to 3 times daily.  - If x-ray results are unremarkable and medication does not alleviate pain, inform via TapFwd message for potential referral to physical therapist.    - DX-THORACIC SPINE-2 VIEWS; Future  - cyclobenzaprine (FLEXERIL) 5 MG tablet; Take 1-2 Tablets by mouth at bedtime as needed for Muscle Spasms or Moderate Pain.  Dispense: 60 Tablet; Refill: 0    2. Gastroesophageal reflux disease without esophagitis  Chronic,stable  - Advised to take Pepcid every nightly as needed  - " Refill for Pepcid provided.  - Reports occasional forgetfulness in taking medication but will continue as needed.  - famotidine (PEPCID) 20 MG Tab; Take 1 Tablet by mouth every evening.  Dispense: 90 Tablet; Refill: 1    3. Essential hypertension  Chronic, stable   - Blood pressure readings around 110-120s  - today's reading at 120/69 mmHg.  - Advised to monitor blood pressure at home and ensure adequate hydration.    Plan:  Continue current medications  - amlodipine 10 mg daily    Instructions provided   - check home BP regularly at least 1-2 times a week and keep log  - return to clinic in 3 months (or sooner if home BP is persistently elevated above goal).   - alcohol cessation   - regular exercises   - avoid illicit drugs and tobacco  - DASH diet , low salt diet   - weight loss.     - amLODIPine (NORVASC) 10 MG Tab; Take 1 Tablet by mouth every day.  Dispense: 90 Tablet; Refill: 1    4. healthcare  - Comp Metabolic Panel; Future  - Lipid Profile; Future  - CBC WITHOUT DIFFERENTIAL; Future  - TSH WITH REFLEX TO FT4; Future  - HEMOGLOBIN A1C; Future    5. Prostate cancer screening  - PROSTATE SPECIFIC AG SCREENING; Future    6. Colon cancer screening  - Cologuard® colon cancer screening      . Health maintenance:  - Due for annual laboratory tests, to be conducted in a fasting state.  - Advised to contact [facility] regarding test results from 03/2024; if not received, request a new kit.  - New [employer] kit already ordered.  - Reminded to take blood pressure medication as prescribed and follow up if there are any concerns.    F/u for annual in  6 months    Please note that this dictation was created using voice recognition software. I have made every reasonable attempt to correct obvious errors, but I expect that there are errors of grammar and possibly content that I did not discover before finalizing the note.

## 2025-05-19 ENCOUNTER — HOSPITAL ENCOUNTER (OUTPATIENT)
Dept: LAB | Facility: MEDICAL CENTER | Age: 50
End: 2025-05-19
Attending: STUDENT IN AN ORGANIZED HEALTH CARE EDUCATION/TRAINING PROGRAM
Payer: COMMERCIAL

## 2025-05-19 DIAGNOSIS — Z12.5 PROSTATE CANCER SCREENING: ICD-10-CM

## 2025-05-19 DIAGNOSIS — Z00.00 WELLNESS EXAMINATION: ICD-10-CM

## 2025-05-19 LAB
ERYTHROCYTE [DISTWIDTH] IN BLOOD BY AUTOMATED COUNT: 40.8 FL (ref 35.9–50)
EST. AVERAGE GLUCOSE BLD GHB EST-MCNC: 108 MG/DL
HBA1C MFR BLD: 5.4 % (ref 4–5.6)
HCT VFR BLD AUTO: 45.2 % (ref 42–52)
HGB BLD-MCNC: 14.6 G/DL (ref 14–18)
MCH RBC QN AUTO: 28.5 PG (ref 27–33)
MCHC RBC AUTO-ENTMCNC: 32.3 G/DL (ref 32.3–36.5)
MCV RBC AUTO: 88.3 FL (ref 81.4–97.8)
PLATELET # BLD AUTO: 283 K/UL (ref 164–446)
PMV BLD AUTO: 8.9 FL (ref 9–12.9)
RBC # BLD AUTO: 5.12 M/UL (ref 4.7–6.1)
WBC # BLD AUTO: 6.1 K/UL (ref 4.8–10.8)

## 2025-05-19 PROCEDURE — 80053 COMPREHEN METABOLIC PANEL: CPT

## 2025-05-19 PROCEDURE — 85027 COMPLETE CBC AUTOMATED: CPT

## 2025-05-19 PROCEDURE — 80061 LIPID PANEL: CPT

## 2025-05-19 PROCEDURE — 36415 COLL VENOUS BLD VENIPUNCTURE: CPT

## 2025-05-19 PROCEDURE — 83036 HEMOGLOBIN GLYCOSYLATED A1C: CPT

## 2025-05-19 PROCEDURE — 84443 ASSAY THYROID STIM HORMONE: CPT

## 2025-05-19 PROCEDURE — 84153 ASSAY OF PSA TOTAL: CPT

## 2025-05-20 LAB
ALBUMIN SERPL BCP-MCNC: 4.3 G/DL (ref 3.2–4.9)
ALBUMIN/GLOB SERPL: 1.4 G/DL
ALP SERPL-CCNC: 65 U/L (ref 30–99)
ALT SERPL-CCNC: 36 U/L (ref 2–50)
ANION GAP SERPL CALC-SCNC: 10 MMOL/L (ref 7–16)
AST SERPL-CCNC: 28 U/L (ref 12–45)
BILIRUB SERPL-MCNC: 0.4 MG/DL (ref 0.1–1.5)
BUN SERPL-MCNC: 24 MG/DL (ref 8–22)
CALCIUM ALBUM COR SERPL-MCNC: 9.2 MG/DL (ref 8.5–10.5)
CALCIUM SERPL-MCNC: 9.4 MG/DL (ref 8.5–10.5)
CHLORIDE SERPL-SCNC: 104 MMOL/L (ref 96–112)
CHOLEST SERPL-MCNC: 218 MG/DL (ref 100–199)
CO2 SERPL-SCNC: 23 MMOL/L (ref 20–33)
CREAT SERPL-MCNC: 0.96 MG/DL (ref 0.5–1.4)
GFR SERPLBLD CREATININE-BSD FMLA CKD-EPI: 95 ML/MIN/1.73 M 2
GLOBULIN SER CALC-MCNC: 3.1 G/DL (ref 1.9–3.5)
GLUCOSE SERPL-MCNC: 86 MG/DL (ref 65–99)
HDLC SERPL-MCNC: 40 MG/DL
LDLC SERPL CALC-MCNC: 159 MG/DL
POTASSIUM SERPL-SCNC: 4.4 MMOL/L (ref 3.6–5.5)
PROT SERPL-MCNC: 7.4 G/DL (ref 6–8.2)
PSA SERPL DL<=0.01 NG/ML-MCNC: 0.41 NG/ML (ref 0–4)
SODIUM SERPL-SCNC: 137 MMOL/L (ref 135–145)
TRIGL SERPL-MCNC: 97 MG/DL (ref 0–149)
TSH SERPL DL<=0.005 MIU/L-ACNC: 1.21 UIU/ML (ref 0.38–5.33)

## 2025-05-21 ENCOUNTER — RESULTS FOLLOW-UP (OUTPATIENT)
Dept: MEDICAL GROUP | Facility: MEDICAL CENTER | Age: 50
End: 2025-05-21
Payer: COMMERCIAL

## 2025-05-27 ENCOUNTER — HOSPITAL ENCOUNTER (OUTPATIENT)
Dept: RADIOLOGY | Facility: MEDICAL CENTER | Age: 50
End: 2025-05-27
Attending: STUDENT IN AN ORGANIZED HEALTH CARE EDUCATION/TRAINING PROGRAM
Payer: COMMERCIAL

## 2025-05-27 DIAGNOSIS — M54.6 ACUTE MIDLINE THORACIC BACK PAIN: ICD-10-CM

## 2025-05-27 PROCEDURE — 72070 X-RAY EXAM THORAC SPINE 2VWS: CPT

## 2025-06-02 ENCOUNTER — OFFICE VISIT (OUTPATIENT)
Dept: MEDICAL GROUP | Facility: MEDICAL CENTER | Age: 50
End: 2025-06-02
Payer: COMMERCIAL

## 2025-06-02 VITALS
HEIGHT: 71 IN | BODY MASS INDEX: 30.21 KG/M2 | RESPIRATION RATE: 20 BRPM | SYSTOLIC BLOOD PRESSURE: 124 MMHG | WEIGHT: 215.8 LBS | TEMPERATURE: 97.9 F | DIASTOLIC BLOOD PRESSURE: 70 MMHG | OXYGEN SATURATION: 98 % | HEART RATE: 56 BPM

## 2025-06-02 DIAGNOSIS — Z23 NEED FOR VACCINATION: ICD-10-CM

## 2025-06-02 DIAGNOSIS — E66.9 OBESITY (BMI 30-39.9): ICD-10-CM

## 2025-06-02 DIAGNOSIS — R10.13 DYSPEPSIA: Primary | ICD-10-CM

## 2025-06-02 DIAGNOSIS — E78.2 MIXED HYPERLIPIDEMIA: ICD-10-CM

## 2025-06-02 DIAGNOSIS — M47.814 OSTEOARTHRITIS OF THORACIC SPINE, UNSPECIFIED SPINAL OSTEOARTHRITIS COMPLICATION STATUS: ICD-10-CM

## 2025-06-02 DIAGNOSIS — K21.9 GASTROESOPHAGEAL REFLUX DISEASE WITHOUT ESOPHAGITIS: ICD-10-CM

## 2025-06-02 DIAGNOSIS — L50.8 RECURRENT URTICARIA: ICD-10-CM

## 2025-06-02 PROCEDURE — 90471 IMMUNIZATION ADMIN: CPT | Performed by: STUDENT IN AN ORGANIZED HEALTH CARE EDUCATION/TRAINING PROGRAM

## 2025-06-02 PROCEDURE — 3078F DIAST BP <80 MM HG: CPT | Performed by: STUDENT IN AN ORGANIZED HEALTH CARE EDUCATION/TRAINING PROGRAM

## 2025-06-02 PROCEDURE — 3074F SYST BP LT 130 MM HG: CPT | Performed by: STUDENT IN AN ORGANIZED HEALTH CARE EDUCATION/TRAINING PROGRAM

## 2025-06-02 PROCEDURE — 90746 HEPB VACCINE 3 DOSE ADULT IM: CPT | Mod: JZ | Performed by: STUDENT IN AN ORGANIZED HEALTH CARE EDUCATION/TRAINING PROGRAM

## 2025-06-02 PROCEDURE — 99214 OFFICE O/P EST MOD 30 MIN: CPT | Mod: 25 | Performed by: STUDENT IN AN ORGANIZED HEALTH CARE EDUCATION/TRAINING PROGRAM

## 2025-06-02 RX ORDER — FLUTICASONE PROPIONATE 50 MCG
1 SPRAY, SUSPENSION (ML) NASAL DAILY
Qty: 16 G | Refills: 0 | Status: SHIPPED | OUTPATIENT
Start: 2025-06-02

## 2025-06-02 RX ORDER — HYDROXYZINE HYDROCHLORIDE 25 MG/1
25 TABLET, FILM COATED ORAL 2 TIMES DAILY PRN
Qty: 30 TABLET | Refills: 0 | Status: SHIPPED | OUTPATIENT
Start: 2025-06-02

## 2025-06-02 RX ORDER — PANTOPRAZOLE SODIUM 40 MG/1
40 TABLET, DELAYED RELEASE ORAL DAILY
Qty: 30 TABLET | Refills: 2 | Status: SHIPPED | OUTPATIENT
Start: 2025-06-02

## 2025-06-02 ASSESSMENT — FIBROSIS 4 INDEX: FIB4 SCORE: 0.86

## 2025-06-02 NOTE — PROGRESS NOTES
Subjective:     CC:   Chief Complaint   Patient presents with    Results     Lab results and back x-ray results. Symptoms of nausea, fatigue and dizzines have improved but still come and go.  Back pain Rt. Sided is still there.     Fatigue     Tired, nausiated and dizziness x 1 mo: Pt's feeling better but the symptoms still come and go.     Rash     Off/on On different part of his body: Pt believes it could be his medications.         HPI:   Donnie presents today with  Verbal consent was acquired by the patient to use [employer] ambient listening note generation during this visit Yes   History of Present Illness  The patient presents for evaluation of nausea, nasal congestion, hand numbness, and elevated cholesterol.    He has been experiencing intermittent episodes of nausea for several weeks, often accompanied by dizziness. These symptoms typically manifest upon waking and are exacerbated by the consumption of coffee or tea on an empty stomach. He reports no heartburn in the morning. His daily intake includes 3 to 4 cups of coffee and occasional green tea. He does not consume energy drinks. He has abstained from alcohol for the past 6 months and does not smoke. He travels to [place] annually. He has been managing these symptoms with medication.    He also reports morning nasal congestion, which he believes may contribute to his dizziness and nausea. He reports feeling better when he is able to breathe normally.    He experiences occasional numbness and tingling in his hands, but it is not severe. He has been monitoring his blood pressure, which has been stable at 128/124.    He has discontinued his cholesterol medication due to associated nausea and occasional rash, although he is uncertain if these side effects were directly caused by the medication.    PAST SURGICAL HISTORY:  He had surgery on his lumbar spine.    SOCIAL HISTORY  - Does not smoke  - Abstaining from alcohol for 6 months    FAMILY HISTORY  - Not  "aware of any family history of heart problems    Results  Labs   - Cholesterol levels: High    Imaging   - X-ray of the hand: Showed some degenerative changes       Lab Results   Component Value Date/Time    CHOLSTRLTOT 218 (H) 05/19/2025 1028    TRIGLYCERIDE 97 05/19/2025 1028    HDL 40 05/19/2025 1028     (H) 05/19/2025 1028     Xray :  Thoracic spine maintains normal height and alignment. There are mild degenerative changes as evidenced by marginal osteophytes and reduced disc spaces.  Mild degenerative changes.      Health Maintenance: Completed    ROS:  ROS    Review of systems unremarkable except for concerns noted by patient or items listed.    Please see HPI for additional ROS.      Objective:     Exam:  /70 (BP Location: Left arm, Patient Position: Sitting, BP Cuff Size: Adult long)   Pulse (!) 56   Temp 36.6 °C (97.9 °F) (Temporal)   Resp 20   Ht 1.803 m (5' 11\")   Wt 97.9 kg (215 lb 12.8 oz)   SpO2 98%   BMI 30.10 kg/m²  Body mass index is 30.1 kg/m².    Physical Exam  Constitutional:       Appearance: Normal appearance.   HENT:      Head: Normocephalic and atraumatic.      Nose: Nose normal.   Eyes:      Conjunctiva/sclera: Conjunctivae normal.   Cardiovascular:      Rate and Rhythm: Normal rate.   Neurological:      Mental Status: He is alert and oriented to person, place, and time. Mental status is at baseline.   Psychiatric:         Mood and Affect: Mood normal.         Behavior: Behavior normal.             Labs: reviewed    Assessment & Plan:     50 y.o. male with the following -     1. Dyspepsia (Primary)  Chronic,not well controlled  - recurrent daily morning nausea and discomfort .Symptoms suggest potential issue with acid reflux; feeling dizziness only after having severe nausea in the morning usually after having coffee -  may be a consequence of nausea/GERD  - Advised to abstain from alcohol, smoking, and spicy food consumption at night  - Reduction in coffee intake " recommended  -  initiate new prescription for pantoprazole to be taken on an empty stomach in the morning  - Instructed to avoid coffee on an empty stomach and consume post-breakfast  - Stool study for H. pylori testing to be conducted prior to new medication initiation  - If test results are positive, follow up for further treatment  - If current treatment plan is ineffective, referral to a gastroenterologist will be considered  - pantoprazole (PROTONIX) 40 MG Tablet Delayed Response; Take 1 Tablet by mouth every day.  Dispense: 30 Tablet; Refill: 2  - H.PYLORI STOOL ANTIGEN; Future    3. Gastroesophageal reflux disease without esophagitis  Chronic condition, not well controlled  Nausea present   Denies:   - heartburn, epigastric pain.   -  vomiting, or diarrhea  - dysphagia  - abnormal cough  - blood in the stool or dark tarry stools.  - early satiety  - tobacco use.  Plan:  Appropriate counseling given.  Discussed - weight loss, elevated the head of the bed, avoid common food triggers (fatty or fried foods, tomato sauce, alcohol, chocolate, mint, garlic, onion, and caffeine)  Recommended avoiding smoking,alcohol and excessive coffee use  RX: PPI   Discussed side effects --> Increased risks of vitamin/mineral deficiency, GI infection, and bone loss associated with PPI.   Patient verbalized understanding   - pantoprazole (PROTONIX) 40 MG Tablet Delayed Response; Take 1 Tablet by mouth every day.  Dispense: 30 Tablet; Refill: 2      2. Need for vaccination  - Hepatitis B Vaccine Adult 20+      4. Osteoarthritis of thoracic spine, unspecified spinal osteoarthritis complication status  Chronic,stable  Xray :  Thoracic spine maintains normal height and alignment. There are mild degenerative changes as evidenced by marginal osteophytes and reduced disc spaces.  Mild degenerative changes.  Continue healthy diet and exercise   If worsening,will refer for physical therapy     5. Obesity (BMI 30-39.9)  - Patient identified  as having weight management issue.  Appropriate orders and counseling given.    6. Mixed hyperlipidemia  Chronic,stable           Lab Results   Component Value Date/Time     CHOLSTRLTOT 218 (H) 05/19/2025 1028     TRIGLYCERIDE 97 05/19/2025 1028     HDL 40 05/19/2025 1028      (H) 05/19/2025 1028      The 10-year ASCVD risk score (Shayan BROOKS, et al., 2019) is: 5.6%  Previously on lipitor 10 mg daily but stopped for last few months - due to rash . Would liekt o manage conservatively and decide on trying different medication after next labs   Recommended to consider CT cardiac calcium testing   - CT-CARDIAC SCORING; Future    7. Recurrent urticaria   placed a referral to allergy specialist for recurrent rash and urticaria   Call [facility] to check for referral provider in 1 week and then make apt with them  Take hydroxyzine as needed for rash/itching ( can make you drowsy )      - Referral to Allergy      Please note that this dictation was created using voice recognition software. I have made every reasonable attempt to correct obvious errors, but I expect that there are errors of grammar and possibly content that I did not discover before finalizing the note.

## 2025-06-03 ENCOUNTER — HOSPITAL ENCOUNTER (OUTPATIENT)
Facility: MEDICAL CENTER | Age: 50
End: 2025-06-03
Attending: STUDENT IN AN ORGANIZED HEALTH CARE EDUCATION/TRAINING PROGRAM
Payer: COMMERCIAL

## 2025-06-03 DIAGNOSIS — R10.13 DYSPEPSIA: ICD-10-CM

## 2025-06-03 LAB — H PYLORI AG STL QL IA: NOT DETECTED

## 2025-06-03 PROCEDURE — 87338 HPYLORI STOOL AG IA: CPT

## 2025-06-04 ENCOUNTER — RESULTS FOLLOW-UP (OUTPATIENT)
Dept: MEDICAL GROUP | Facility: MEDICAL CENTER | Age: 50
End: 2025-06-04
Payer: COMMERCIAL

## 2025-06-12 NOTE — Clinical Note
REFERRAL APPROVAL NOTICE         Sent on June 12, 2025                   Donnie Horton  [address]  [place] [address]                   Dear Mr. Darrin Horton,    After a careful review of the medical information and benefit coverage, [facility] has processed your referral. See below for additional details.    If applicable, you must be actively enrolled with your insurance for coverage of the authorized service. If you have any questions regarding your coverage, please contact your insurance directly.    REFERRAL INFORMATION   Referral #:  [id number]  Referred-To Provider    Referred-By Provider:  Allergy and Immunology    Tahmina Levy M.D.   [facility]      [address]  [address]  [place] [id number] [phone] [address] [facility] [id number] [phone]    Referral Start Date:  06/02/2025  Referral End Date:   06/02/2026             SCHEDULING  If you do not already have an appointment, please call [phone] to make an appointment.     MORE INFORMATION  If you do not already have a AddSearch account, sign up at: [email or web address]  You can access your medical information, make appointments, see lab results, billing information, and more.  If you have questions regarding this referral, please contact  the [facility] Referrals department at:             [phone]. Monday - Friday 8:00AM - 5:00PM.     Sincerely,    [facility]

## (undated) DEVICE — TUBING C&T SET FLYING LEADS DRAIN TUBING (10EA/BX)

## (undated) DEVICE — DRAPE 36X28IN RAD CARM BND BG - (25/CA) O

## (undated) DEVICE — TUBING CLEARLINK DUO-VENT - C-FLO (48EA/CA)

## (undated) DEVICE — TOOL MR8 14CM MATCH HD SYM-TRI 3MM DIAMETER (1/EA)

## (undated) DEVICE — SUTURE 1 STRATAFIX SYMMETRIC PDS CTX 45CM (12EA/BX)

## (undated) DEVICE — ELECTRODE DUAL RETURN W/ CORD - (50/PK)

## (undated) DEVICE — SLEEVE, VASO, THIGH, MED

## (undated) DEVICE — DRAPE LAPAROTOMY T SHEET - (12EA/CA)

## (undated) DEVICE — SET EXTENSION WITH 2 PORTS (48EA/CA) ***PART #2C8610 IS A SUBSTITUTE*****

## (undated) DEVICE — SUCTION INSTRUMENT YANKAUER OPEN TIP W/O VENT (50EA/CA)

## (undated) DEVICE — SUCTION INSTRUMENT YANKAUER BULBOUS TIP W/O VENT (50EA/CA)

## (undated) DEVICE — BLADE SURGICAL CLIPPER - (50EA/CA)

## (undated) DEVICE — PACK NEURO - (2EA/CA)

## (undated) DEVICE — GLOVE BIOGEL PI INDICATOR SZ 8.0 SURGICAL PF LF -(50/BX 4BX/CA)

## (undated) DEVICE — HEADREST PRONEVIEW LARGE - (10/CA)

## (undated) DEVICE — GOWN WARMING STANDARD FLEX - (30/CA)

## (undated) DEVICE — MIDAS LUBRICATOR DIFFUSER PACK (4EA/CA)

## (undated) DEVICE — COVER LIGHT HANDLE ALC PLUS DISP (18EA/BX)

## (undated) DEVICE — CANISTER SUCTION 3000ML MECHANICAL FILTER AUTO SHUTOFF MEDI-VAC NONSTERILE LF DISP  (40EA/CA)

## (undated) DEVICE — BOVIE BLADE COATED - (50/PK)

## (undated) DEVICE — RESERVOIR SUCTION 100 CC - SILICONE (20EA/CA)

## (undated) DEVICE — SODIUM CHL IRRIGATION 0.9% 1000ML (12EA/CA)

## (undated) DEVICE — LACTATED RINGERS INJ 1000 ML - (14EA/CA 60CA/PF)

## (undated) DEVICE — GLOVE SZ 7.5 BIOGEL PI MICRO - PF LF (50PR/BX)

## (undated) DEVICE — CHLORAPREP 26 ML APPLICATOR - ORANGE TINT(25/CA)

## (undated) DEVICE — SUTURE GENERAL

## (undated) DEVICE — SUTURE 0 VICRYL PLUS CT-1 - 8 X 18 INCH (12/BX)

## (undated) DEVICE — KIT SURGIFLO W/OUT THROMBIN - (6EA/CA)

## (undated) DEVICE — COVER MAYO STAND X-LG - (22EA/CA)

## (undated) DEVICE — ARMREST CRADLE FOAM - (2PR/PK 12PR/CA)

## (undated) DEVICE — SENSOR OXIMETER ADULT SPO2 RD SET (20EA/BX)

## (undated) DEVICE — SUTURE 2-0 VICRYL CP-2 (12EA/BX)

## (undated) DEVICE — DRAPE STRLE REG TOWEL 18X24 - (10/BX 4BX/CA)"

## (undated) DEVICE — SET LEADWIRE 5 LEAD BEDSIDE DISPOSABLE ECG (1SET OF 5/EA)

## (undated) DEVICE — LACTATED RINGERS INJ. 500 ML - (24EA/CA)